# Patient Record
Sex: MALE | Race: WHITE
[De-identification: names, ages, dates, MRNs, and addresses within clinical notes are randomized per-mention and may not be internally consistent; named-entity substitution may affect disease eponyms.]

---

## 2018-01-26 ENCOUNTER — HOSPITAL ENCOUNTER (INPATIENT)
Dept: HOSPITAL 74 - JER | Age: 63
LOS: 1 days | Discharge: HOME | DRG: 253 | End: 2018-01-27
Attending: INTERNAL MEDICINE | Admitting: INTERNAL MEDICINE
Payer: COMMERCIAL

## 2018-01-26 VITALS — BODY MASS INDEX: 31 KG/M2

## 2018-01-26 DIAGNOSIS — E11.9: ICD-10-CM

## 2018-01-26 DIAGNOSIS — K92.2: Primary | ICD-10-CM

## 2018-01-26 DIAGNOSIS — I10: ICD-10-CM

## 2018-01-26 DIAGNOSIS — L30.9: ICD-10-CM

## 2018-01-26 LAB
ALBUMIN SERPL-MCNC: 4.1 G/DL (ref 3.4–5)
ALP SERPL-CCNC: 59 U/L (ref 45–117)
ALT SERPL-CCNC: 39 U/L (ref 12–78)
ANION GAP SERPL CALC-SCNC: 9 MMOL/L (ref 8–16)
APPEARANCE UR: CLEAR
AST SERPL-CCNC: 20 U/L (ref 15–37)
BACTERIA #/AREA URNS HPF: (no result) /HPF
BASOPHILS # BLD: 1 % (ref 0–2)
BILIRUB SERPL-MCNC: 0.4 MG/DL (ref 0.2–1)
BILIRUB UR STRIP.AUTO-MCNC: NEGATIVE MG/DL
BUN SERPL-MCNC: 17 MG/DL (ref 7–18)
CALCIUM SERPL-MCNC: 8.6 MG/DL (ref 8.5–10.1)
CHLORIDE SERPL-SCNC: 108 MMOL/L (ref 98–107)
CO2 SERPL-SCNC: 25 MMOL/L (ref 21–32)
COLOR UR: YELLOW
CREAT SERPL-MCNC: 0.9 MG/DL (ref 0.7–1.3)
DEPRECATED RDW RBC AUTO: 14 % (ref 11.9–15.9)
EOSINOPHIL # BLD: 3.1 % (ref 0–4.5)
EPITH CASTS URNS QL MICRO: (no result) /HPF
GLUCOSE SERPL-MCNC: 107 MG/DL (ref 74–106)
HCT VFR BLD CALC: 43.3 % (ref 35.4–49)
HGB BLD-MCNC: 14.6 GM/DL (ref 11.7–16.9)
HYALINE CASTS URNS QL MICRO: 2 /LPF
INR BLD: 0.93 (ref 0.82–1.09)
KETONES UR QL STRIP: NEGATIVE
LEUKOCYTE ESTERASE UR QL STRIP.AUTO: NEGATIVE
LYMPHOCYTES # BLD: 19 % (ref 8–40)
MCH RBC QN AUTO: 28.4 PG (ref 25.7–33.7)
MCHC RBC AUTO-ENTMCNC: 33.6 G/DL (ref 32–35.9)
MCV RBC: 84.4 FL (ref 80–96)
MONOCYTES # BLD AUTO: 6 % (ref 3.8–10.2)
MUCOUS THREADS URNS QL MICRO: (no result)
NEUTROPHILS # BLD: 70.9 % (ref 42.8–82.8)
NITRITE UR QL STRIP: NEGATIVE
PH UR: 5 [PH] (ref 5–8)
PLATELET # BLD AUTO: 261 K/MM3 (ref 134–434)
PMV BLD: 8.7 FL (ref 7.5–11.1)
POTASSIUM SERPLBLD-SCNC: 4.2 MMOL/L (ref 3.5–5.1)
PROT SERPL-MCNC: 7.5 G/DL (ref 6.4–8.2)
PROT UR QL STRIP: (no result)
PROT UR QL STRIP: NEGATIVE
PT PNL PPP: 10.5 SEC (ref 9.98–11.88)
RBC # BLD AUTO: 5.13 M/MM3 (ref 4–5.6)
RBC # UR STRIP: NEGATIVE /UL
SODIUM SERPL-SCNC: 142 MMOL/L (ref 136–145)
SP GR UR: 1.02 (ref 1–1.03)
UROBILINOGEN UR STRIP-MCNC: NEGATIVE MG/DL (ref 0.2–1)
WBC # BLD AUTO: 10.3 K/MM3 (ref 4–10)

## 2018-01-26 RX ADMIN — NICOTINE SCH MG: 21 PATCH TRANSDERMAL at 22:18

## 2018-01-26 RX ADMIN — INSULIN ASPART SCH: 100 INJECTION, SOLUTION INTRAVENOUS; SUBCUTANEOUS at 21:46

## 2018-01-26 NOTE — PDOC
History of Present Illness





- General


Chief Complaint: Rectal Bleed


Stated Complaint: BLOOD IN URINE/ STOOL


Time Seen by Provider: 01/26/18 16:48





- History of Present Illness


Initial Comments: 





Mr Gary is a 61yo M with a PMHx of NIDDM, Psoriasis, HTN who presents w/ 

painless hematochezia since this morning. This morning it was dark red, but 

while going to bathroom in ER, noticed the blood was bright red. Denies prior 

constipation or straining. Denies hx of hemorrhoids. Not on anticoagulants. 

However, has taken multiple NSAIDs over the past 4 days for tooth pain. Daily 1 

pack per day smoker. Last colonoscopy was 5 years ago with Dr York, told it 

was normal. No hx of UC/Crohns. Denies abdominal pain. Denies hematemesis, 

hemoptysis. 





Allergies - NKDA


SH - Daily 1ppd smoker. No alcohol


PMD - Dr. Rosa Funk ([facility])








Past History





- Past Medical History


Allergies/Adverse Reactions: 


 Allergies











Allergy/AdvReac Type Severity Reaction Status Date / Time


 


No Known Allergies Allergy   Verified 03/06/16 16:05











Home Medications: 


Ambulatory Orders





Amlodipine Besylate [Norvasc -] 10 mg PO DAILY 01/26/18 


Sitagliptin Phosphate [Januvia] 100 mg PO DAILY 01/26/18 








Anemia: No


Asthma: No


Cancer: No


Cardiac Disorders: No


CVA: No


COPD: No


CHF: No


Dementia: No


Diabetes: Yes (NIDDM)


GI Disorders: No


 Disorders: No


HTN: Yes


Hypercholesterolemia: Yes


Liver Disease: No


Seizures: No


Thyroid Disease: No





- Surgical History


Abdominal Surgery: Yes (HERNIA)


Appendectomy: Yes


Cardiac Surgery: No


Cholecystectomy: Yes


Lung Surgery: No


Neurologic Surgery: No


Orthopedic Surgery: No





- Suicide/Smoking/Psychosocial Hx


Smoking Status: Yes


Smoking History: Never smoked


Have you smoked in the past 12 months: Yes


Number of Cigarettes Smoked Daily: 5


If you are a former smoker, when did you quit?: 2 weeks ago


'Breaking Loose' booklet given: 07/12/15


Hx Alcohol Use: No


Drug/Substance Use Hx: No


Substance Use Type: None


Hx Substance Use Treatment: No





*Physical Exam





- Vital Signs


 Last Vital Signs











Temp Pulse Resp BP Pulse Ox


 


 97.6 F   79   16   138/84   99 


 


 01/26/18 14:04  01/26/18 14:04  01/26/18 14:04  01/26/18 14:04  01/26/18 14:04














- Physical Exam


Comments: 





GEN: AAOx3, NAD, Lying comfortably


HEENT: PERRLA, EOMi


CV: S1, S2, RRR


LUNG: CTABL


ABD: Soft, NT, ND, normoactive BS


MSK: No edema, numerous psoriatic lesions on body


: 


   No visible external hemorrhoids, no fissures


   On NERI, dark stool w/ streaks of blood


NEURO: CN 2-12 intact. No MSK or sensation deficits








ED Treatment Course





- LABORATORY


CBC & Chemistry Diagram: 


 01/26/18 17:20





 01/26/18 17:33





Medical Decision Making





- Medical Decision Making





61yo M who presented w/ painless melena after taking multiple NSAIDs, but now 

has BRBPR. Vitals are stable. No hemorrhoids/fissures on physical exam.


   DDx include: Diverticular bleed, Colitis, Colon CA. Because bleeding is non-

painful, unlikely diagnosis includes IBD, Ischemic colitis





-- CBC, CMP, Coags, T&S


-- Prior colonoscopy performed by Dr. York, will place call to Dr. York


-- Anticipate admission for possible scope. PCP is outside








01/26/18 18:41


Callback received from Dr. Delgado. Case discussed and accepted


Attempting to reach Dr. York second time.


Dr. Davis will takover the case from here.








*DC/Admit/Observation/Transfer


Diagnosis at time of Disposition: 


 Hematochezia








- Discharge Dispostion


Condition at time of disposition: Stable


Admit: Yes





- Referrals


Referrals: 


Rosa Daniels MD [Primary Care Provider] - 





- Patient Instructions





- Post Discharge Activity

## 2018-01-26 NOTE — PDOC
Attending Attestation





- HPI


HPI: 


01/26/18 17:38


The patient is a 62-year-old male, with a significant past medical history of 

NIDDM, Psoriasis, HTN who presents to the ED with rectal bleeding that began 

this morning. When the patient passed a bowel movement this morning and states 

that he noted his stool was dark red. While in the ED, the patient used the 

bathroom and states that the blood is now bright red.





<Bernadette Macedo - Last Filed: 01/26/18 17:38>





- Resident


Resident Name: Katherine Frank





- ED Attending Attestation


I have performed the following: I have examined & evaluated the patient, The 

case was reviewed & discussed with the resident, I agree w/resident's findings 

& plan, Exceptions are as noted





- Physicial Exam


PE: 





01/26/18 18:30


Patient is awake and alert, obese, hemodynamically stable, in no distress.





Normocephalic, atraumatic


PERRLA, EOMI,


CTA,


rrr


sft, nt, nd





- Medical Decision Making





01/26/18 18:31


Patient is a 62-year-old male with history of diabetes, psoriasis and heavy 

smoking who presents with several episodes of painless rectal bleeding with 

initially maroon blood followed by bright red blood per rectum. In the ER, 

patient is hemodynamically stable without evidence of active bleeding. Patient 

is noted to be guaiac positive. First CBC reveals H&H 14 and 43 respectively. I 

do not suspect ischemic colitis or inflammatory bowel disease at this time. 

Patient will require colonoscopy with GI. Will admit for serial CBCs, GI 

evaluation and treatment.





<Cornelius Davis - Last Filed: 01/26/18 18:32>

## 2018-01-26 NOTE — PN
Teaching Attending Note


Name of Resident: Thania Jamison





ATTENDING PHYSICIAN STATEMENT





I saw and evaluated the patient.


Chart, data, imaging reviewed. 


I reviewed the resident's note and discussed the case with the resident.


I agree with the resident's findings and plan as documented.








SUBJECTIVE:


61yo M with a PMHx of NIDDM, Psoriasis, HTN c/o 2 BMs on 1/26/17 with bright 

red blood. BMs were painless, formed. Amount of blood was small, not enough to 

fill small cup. There was no LOC or dizziness. No major changes in diet. GI was 

called, planned for colonoscopy. Last colonoscopy was 5 years ago and 

reportedly normal. Patient reported 2 days of NSAID use prior to presentation. 

Denied any epigastric pain or history of gastritis or PUD. NERI performed in ER 

and did not show external hemorrhoids. 





OBJECTIVE:


 Last Vital Signs











Temp Pulse Resp BP Pulse Ox


 


 97.6 F   75   17   136/82   98 


 


 01/26/18 14:04  01/26/18 19:18  01/26/18 19:18  01/26/18 19:18  01/26/18 19:18








general- comfortable, nad, obese 


heent- no sinus tenderness, moist oral mucosa 


neck -supple, no masses


cv -s1+s2+ rrr 


chest - cta b/l 


abdomen- soft, nt, no masses, obese 


ext -no pedal edema





CXR -reviewed, clear costophrenic angles 


 Abnormal Lab Results











  01/26/18 01/26/18 01/26/18





  17:20 17:20 17:33


 


WBC   10.3 H 


 


Chloride    108 H


 


Random Glucose    107 H D


 


Urine Protein  1+ H  


 


Crossmatch   














  01/26/18





  17:33


 


WBC 


 


Chloride 


 


Random Glucose 


 


Urine Protein 


 


Crossmatch  See Detail














ASSESSMENT AND PLAN:


#Painless bright red blood per rectum in a 61yo man. VS are stable and 

Hemoglobin and hematocrit normal stable. Differential diagnosis included 

internal hemorroids, AV malformation, diverticulosis. 


-admit to med/surg obs 


-monitor H,H 


-GI consult for colonoscopy 


-no heparin or NSAIDs 


-NPO past midnight 





-continue home meds for chronic medical problems 





-SCDs for DVT ppx

## 2018-01-26 NOTE — HP
CHIEF COMPLAINT: "I have blood in my stool" 





PCP:Dr. Rosa Funk ([facility])





HISTORY OF PRESENT ILLNESS:


This is a 63yo M with a PMH of NIDDM, Psoriasis, HTN who presents due to 2 

episodes of painless hematochezia since this morning. First BM had some dark 

blood and the second one had a smaller amount of bright red blood. both stools 

were well formed and patient reports being constipated a few days prior. He 

endorces having a similar episode once 7 yrs ago, whihc he attributed to 

hemorrhoids which were never officially diagnosed. he had a colonoscopy with Dr York 5-7 yrs ago which was unremarkable and was told to f/u in 5 yrs. Rectal 

exam in ed was negative for lesions but was occult blood positive. He denies 

abd pain, gerd, diarrhea, n/v, back pain, f/c, sick contacts. He denies weight 

loss. He denies cp, sob, cough, rhonorrhea, sore throat, myalgia, arthralgia. 

no family history of gi disorder. 





ER course was notable for:


(1)labs


(2)ekg, cxr:unremarkable 


(3)ivf





Recent Travel: denies 





PAST MEDICAL HISTORY:as above 





PAST SURGICAL HISTORY: appendectomy, cholecystectomy 40 yrs ago 





Social History: lives at home with wife 


[id number] yrs


Alcohol: denies


Drugs: denies





Family History: no history of GI cancer. Brother renal CA 


Allergies





No Known Allergies Allergy (Verified 16 16:05)


 








HOME MEDICATIONS:


 Home Medications











 Medication  Instructions  Recorded


 


Amlodipine Besylate [Norvasc -] 10 mg PO DAILY 18


 


Sitagliptin Phosphate [Januvia] 100 mg PO DAILY 18








REVIEW OF SYSTEMS


CONSTITUTIONAL: 


Absent:  fever, chills, diaphoresis, generalized weakness, malaise, loss of 

appetite, weight change


HEENT: 


Absent:  rhinorrhea, nasal congestion, throat pain


CARDIOVASCULAR: 


Absent: chest pain, syncope, palpitations, irregular heart rate, lightheadedness

, peripheral edema


RESPIRATORY: 


Absent: cough, shortness of breath, dyspnea with exertion, orthopnea, wheezing, 

stridor, hemoptysis


GASTROINTESTINAL:


Absent: abdominal pain, abdominal distension, nausea, vomiting, diarrhea, melena


GENITOURINARY: 


Absent: dysuria, hematuria


MUSCULOSKELETAL: 


Absent: myalgia, arthralgia


SKIN: 


Absent: pallor


HEMATOLOGIC/IMMUNOLOGIC: 


Absent: easy bleeding, easy bruising


ENDOCRINE:


Absent: unexplained weight gain, unexplained weight loss, heat intolerance, 

cold intolerance


NEUROLOGIC: 


Absent: headache, focal weakness or paresthesias


PSYCHIATRIC: 


Absent: anxiety, depression








PHYSICAL EXAMINATION


 Vital Signs - 24 hr











  18





  14:04 17:47 19:18


 


Temperature 97.6 F  


 


Pulse Rate 79  


 


Pulse Rate [  70 75





Radial]   


 


Respiratory 16 16 17





Rate   


 


Blood Pressure 138/84  


 


Blood Pressure  135/80 136/82





[Left Arm]   


 


O2 Sat by Pulse 99 96 98





Oximetry (%)   











GENERAL: Awake, alert, and fully oriented, in no acute distress.


HEAD: Normal with no signs of trauma.


EYES: Pupils equal, round and reactive to light, extraocular movements intact, 

sclera anicteric, conjunctiva clear. No lid lag.


EARS, NOSE, THROAT:  Moist mucous membranes.


NECK: supple


LUNGS: Breath sounds equal, clear to auscultation bilaterally.


HEART: Regular rate and rhythm, normal S1 and S2 


ABDOMEN: obese, RLW surgical scar, eczema, Soft, nontender, not distended, 

normoactive bowel sounds, no guarding, no rebound, no masses. 


RECTAL EXAM: no lesions noted per ED 


MUSCULOSKELETAL:  No CVA tenderness.


UPPER EXTREMITIES: 2+ pulses, warm, well-perfused.  No peripheral edema.


LOWER EXTREMITIES: 2+ pulses, warm, well-perfused. No calf tenderness. No 

peripheral edema. 


NEUROLOGICAL:  Cranial nerves II-XII grossly intact. Normal speech. Normal gait.


PSYCHIATRIC: Cooperative. Good eye contact. Appropriate mood and affect.


SKIN: diffuse eczema, Warm, dry, normal turgor


 Laboratory Results - last 24 hr











  18





  17:01 17:20 17:20


 


WBC    10.3 H


 


RBC    5.13


 


Hgb    14.6


 


Hct    43.3


 


MCV    84.4


 


MCH    28.4


 


MCHC    33.6


 


RDW    14.0


 


Plt Count    261


 


MPV    8.7


 


Neutrophils %    70.9


 


Lymphocytes %    19.0  D


 


Monocytes %    6.0


 


Eosinophils %    3.1  D


 


Basophils %    1.0


 


PT with INR   


 


INR   


 


Sodium   


 


Potassium   


 


Chloride   


 


Carbon Dioxide   


 


Anion Gap   


 


BUN   


 


Creatinine   


 


Creat Clearance w eGFR   


 


Random Glucose   


 


Calcium   


 


Total Bilirubin   


 


AST   


 


ALT   


 


Alkaline Phosphatase   


 


Total Protein   


 


Albumin   


 


Urine Color   Yellow 


 


Urine Appearance   Clear 


 


Urine pH   5.0 


 


Ur Specific Gravity   1.021 


 


Urine Protein   1+ H 


 


Urine Glucose (UA)   Negative 


 


Urine Ketones   Negative 


 


Urine Blood   Negative 


 


Urine Nitrite   Negative 


 


Urine Bilirubin   Negative 


 


Urine Urobilinogen   Negative 


 


Ur Leukocyte Esterase   Negative 


 


Urine WBC (Auto)   1 


 


Urine RBC (Auto)   1 


 


Ur Epithelial Cells   Rare 


 


Urine Bacteria   Rare 


 


Hyaline Casts   2 


 


Urine Mucus   Few 


 


Stool Occult Blood  Positive  


 


Blood Type   


 


Antibody Screen   


 


Crossmatch   














  18





  17:20 17:33 17:33


 


WBC   


 


RBC   


 


Hgb   


 


Hct   


 


MCV   


 


MCH   


 


MCHC   


 


RDW   


 


Plt Count   


 


MPV   


 


Neutrophils %   


 


Lymphocytes %   


 


Monocytes %   


 


Eosinophils %   


 


Basophils %   


 


PT with INR  10.50  


 


INR  0.93  


 


Sodium   142 


 


Potassium   4.2 


 


Chloride   108 H 


 


Carbon Dioxide   25 


 


Anion Gap   9 


 


BUN   17 


 


Creatinine   0.9 


 


Creat Clearance w eGFR   > 60 


 


Random Glucose   107 H D 


 


Calcium   8.6 


 


Total Bilirubin   0.4  D 


 


AST   20  D 


 


ALT   39 


 


Alkaline Phosphatase   59 


 


Total Protein   7.5 


 


Albumin   4.1 


 


Urine Color   


 


Urine Appearance   


 


Urine pH   


 


Ur Specific Gravity   


 


Urine Protein   


 


Urine Glucose (UA)   


 


Urine Ketones   


 


Urine Blood   


 


Urine Nitrite   


 


Urine Bilirubin   


 


Urine Urobilinogen   


 


Ur Leukocyte Esterase   


 


Urine WBC (Auto)   


 


Urine RBC (Auto)   


 


Ur Epithelial Cells   


 


Urine Bacteria   


 


Hyaline Casts   


 


Urine Mucus   


 


Stool Occult Blood   


 


Blood Type    AB POSITIVE


 


Antibody Screen    Negative


 


Crossmatch    See Detail











ASSESSMENT/PLAN:


This is a 63yo M with a PMH of NIDDM, Psoriasis, HTN who presents due to 2 

episodes of painless hematochezia since this morning.





Lower GIB


-upper gi source unlikely 


-possible sources include diverticulosis, av malformation, r/o CA 


-NPO, anticipate scope 


-IVF hydration 


-GI consulted 


-monitor h/h (stable at this time)


-patient hemodynamically stable 


-avoid a/c 





HTN


-resume home [facility]





NIDDM


-ISS, BGM, A1c 





Eczema


-topical hydrocortisone cream 





Dispo: adm m/s 


























Visit type





- Emergency Visit


Emergency Visit: Yes


ED Registration Date: 18


Care time: The patient presented to the Emergency Department on the above date 

and was hospitalized for further evaluation of their emergent condition.





- New Patient


This patient is new to me today: Yes


Date on this admission: 18





- Critical Care


Critical Care patient: No

## 2018-01-27 VITALS — DIASTOLIC BLOOD PRESSURE: 57 MMHG | SYSTOLIC BLOOD PRESSURE: 112 MMHG

## 2018-01-27 VITALS — TEMPERATURE: 98.1 F | HEART RATE: 70 BPM

## 2018-01-27 LAB
ANION GAP SERPL CALC-SCNC: 5 MMOL/L (ref 8–16)
BASOPHILS # BLD: 1 % (ref 0–2)
BUN SERPL-MCNC: 15 MG/DL (ref 7–18)
CALCIUM SERPL-MCNC: 8.6 MG/DL (ref 8.5–10.1)
CHLORIDE SERPL-SCNC: 107 MMOL/L (ref 98–107)
CO2 SERPL-SCNC: 30 MMOL/L (ref 21–32)
CREAT SERPL-MCNC: 0.9 MG/DL (ref 0.7–1.3)
DEPRECATED RDW RBC AUTO: 13.9 % (ref 11.9–15.9)
EOSINOPHIL # BLD: 4.3 % (ref 0–4.5)
GLUCOSE SERPL-MCNC: 117 MG/DL (ref 74–106)
HCT VFR BLD CALC: 41.3 % (ref 35.4–49)
HGB BLD-MCNC: 13.8 GM/DL (ref 11.7–16.9)
LYMPHOCYTES # BLD: 19.9 % (ref 8–40)
MAGNESIUM SERPL-MCNC: 2.1 MG/DL (ref 1.8–2.4)
MCH RBC QN AUTO: 28.2 PG (ref 25.7–33.7)
MCHC RBC AUTO-ENTMCNC: 33.4 G/DL (ref 32–35.9)
MCV RBC: 84.5 FL (ref 80–96)
MONOCYTES # BLD AUTO: 7.1 % (ref 3.8–10.2)
NEUTROPHILS # BLD: 67.7 % (ref 42.8–82.8)
PHOSPHATE SERPL-MCNC: 3.1 MG/DL (ref 2.5–4.9)
PLATELET # BLD AUTO: 235 K/MM3 (ref 134–434)
PMV BLD: 8.7 FL (ref 7.5–11.1)
POTASSIUM SERPLBLD-SCNC: 4.2 MMOL/L (ref 3.5–5.1)
RBC # BLD AUTO: 4.88 M/MM3 (ref 4–5.6)
SODIUM SERPL-SCNC: 142 MMOL/L (ref 136–145)
WBC # BLD AUTO: 8.5 K/MM3 (ref 4–10)

## 2018-01-27 RX ADMIN — INSULIN ASPART SCH: 100 INJECTION, SOLUTION INTRAVENOUS; SUBCUTANEOUS at 06:57

## 2018-01-27 RX ADMIN — NICOTINE SCH MG: 21 PATCH TRANSDERMAL at 10:59

## 2018-01-27 RX ADMIN — INSULIN ASPART SCH: 100 INJECTION, SOLUTION INTRAVENOUS; SUBCUTANEOUS at 11:58

## 2018-01-27 NOTE — CON.GI
Consult


Consult Specialty:: gastroenterology


Referred by:: hospitalist





- Past Medical History


Cardio/Vascular: Yes: HTN


Gastrointestinal: Yes: Other (umbilical hernia)


Endocrine: Yes: Diabetes Mellitus


Dermatology: Yes: Psoriasis





- Alcohol/Substance Use


Hx Alcohol Use: No





- Smoking History


Smoking history: Never smoked


Have you smoked in the past 12 months: Yes


Aproximately how many cigarettes per day: 5


If you are a former smoker, when did you quit?: 2 weeks ago





- Social History


ADL: Independent





Home Medications





- Allergies


Allergies/Adverse Reactions: 


 Allergies











Allergy/AdvReac Type Severity Reaction Status Date / Time


 


No Known Allergies Allergy   Verified 03/06/16 16:05














- Home Medications


Home Medications: 


Ambulatory Orders





Amlodipine Besylate [Norvasc -] 10 mg PO DAILY 01/26/18 


Sitagliptin Phosphate [Januvia] 100 mg PO DAILY 01/26/18 











Family Disease History





- Family Disease History


Family Disease History: CA: Brother (Unknown type)





Physical Exam-GI


Vital Signs: 


 Vital Signs











Temperature  98.1 F   01/27/18 10:00


 


Pulse Rate  70   01/27/18 10:00


 


Respiratory Rate  20   01/27/18 10:00


 


Blood Pressure  114/91   01/27/18 10:00


 


O2 Sat by Pulse Oximetry (%)  98   01/27/18 09:00











Labs: 


 CBC, BMP





 01/27/18 05:50 





 01/27/18 05:50 





 INR, PTT











INR  0.93  (0.82-1.09)   01/26/18  17:20

## 2018-01-27 NOTE — EKG
Test Reason : 

Blood Pressure : ***/*** mmHG

Vent. Rate : 071 BPM     Atrial Rate : 071 BPM

   P-R Int : 168 ms          QRS Dur : 094 ms

    QT Int : 422 ms       P-R-T Axes : 051 039 043 degrees

   QTc Int : 458 ms

 

NORMAL SINUS RHYTHM

POSSIBLE LEFT ATRIAL ENLARGEMENT

BORDERLINE ECG

WHEN COMPARED WITH ECG OF 12-JUL-2015 13:49,

NO SIGNIFICANT CHANGE WAS FOUND

Confirmed by MD ZARI, WILLEM ([id number]) on 1/27/2018 12:18:49 PM

 

Referred By:             Confirmed By:WILLEM HAMEED MD

## 2018-01-27 NOTE — DS
Physical Exam: 


SUBJECTIVE: Patient seen and examined. has had 2 soft, brown BM today. admits 

to straining for the past few days. had 3 episodes of BRBPR where it was not 

mixed in the stool. had colonoscopy 5 years ago, [facility] he was told he had 

hemrrhoids but states "inside I was clean". denies CP, SOB, fever, chills, N/V/C

/D








OBJECTIVE:





 Vital Signs











 Period  Temp  Pulse  Resp  BP Sys/Leon  Pulse Ox


 


 Last 24 Hr  98.0 F-98.6 F  68-87  16-20  110-136/57-91  96-98








PHYSICAL EXAM





GENERAL: The patient is awake, alert, and fully oriented, in no acute distress.


HEAD: Normal with no signs of trauma.


EYES: PERRL, extraocular movements intact, sclera anicteric, conjunctiva clear. 


ENT: Ears normal, nares patent, oropharynx clear without exudates, moist mucous 

membranes.


NECK: Trachea midline, full range of motion, supple. 


LUNGS: Breath sounds equal, clear to auscultation bilaterally, no wheezes, no 

crackles, no accessory muscle use. 


HEART: Regular rate and rhythm, S1, S2 without murmur, rub or gallop.


ABDOMEN: Soft, nontender, nondistended, normoactive bowel sounds, no guarding, 

no rebound, no hepatosplenomegaly, no masses. obese. refused rectal exam


EXTREMITIES: 2+ pulses, warm, well-perfused, no edema. 


NEUROLOGICAL: Cranial nerves II through XII grossly intact. Normal speech, gait 

not observed.


PSYCH: Normal mood, normal affect.


SKIN: Warm, dry, normal turgor, no rashes or lesions noted.





LABS


 Laboratory Results - last 24 hr











  01/26/18 01/26/18 01/26/18





  17:01 17:20 17:20


 


WBC    10.3 H


 


RBC    5.13


 


Hgb    14.6


 


Hct    43.3


 


MCV    84.4


 


MCH    28.4


 


MCHC    33.6


 


RDW    14.0


 


Plt Count    261


 


MPV    8.7


 


Neutrophils %    70.9


 


Lymphocytes %    19.0  D


 


Monocytes %    6.0


 


Eosinophils %    3.1  D


 


Basophils %    1.0


 


PT with INR   


 


INR   


 


Sodium   


 


Potassium   


 


Chloride   


 


Carbon Dioxide   


 


Anion Gap   


 


BUN   


 


Creatinine   


 


Creat Clearance w eGFR   


 


POC Glucometer   


 


Random Glucose   


 


Hemoglobin A1c %   


 


Calcium   


 


Phosphorus   


 


Magnesium   


 


Total Bilirubin   


 


AST   


 


ALT   


 


Alkaline Phosphatase   


 


Total Protein   


 


Albumin   


 


Urine Color   Yellow 


 


Urine Appearance   Clear 


 


Urine pH   5.0 


 


Ur Specific Gravity   1.021 


 


Urine Protein   1+ H 


 


Urine Glucose (UA)   Negative 


 


Urine Ketones   Negative 


 


Urine Blood   Negative 


 


Urine Nitrite   Negative 


 


Urine Bilirubin   Negative 


 


Urine Urobilinogen   Negative 


 


Ur Leukocyte Esterase   Negative 


 


Urine WBC (Auto)   1 


 


Urine RBC (Auto)   1 


 


Ur Epithelial Cells   Rare 


 


Urine Bacteria   Rare 


 


Hyaline Casts   2 


 


Urine Mucus   Few 


 


Stool Occult Blood  Positive  


 


Blood Type   


 


Antibody Screen   


 


Crossmatch   














  01/26/18 01/26/18 01/26/18





  17:20 17:33 17:33


 


WBC   


 


RBC   


 


Hgb   


 


Hct   


 


MCV   


 


MCH   


 


MCHC   


 


RDW   


 


Plt Count   


 


MPV   


 


Neutrophils %   


 


Lymphocytes %   


 


Monocytes %   


 


Eosinophils %   


 


Basophils %   


 


PT with INR  10.50  


 


INR  0.93  


 


Sodium   142 


 


Potassium   4.2 


 


Chloride   108 H 


 


Carbon Dioxide   25 


 


Anion Gap   9 


 


BUN   17 


 


Creatinine   0.9 


 


Creat Clearance w eGFR   > 60 


 


POC Glucometer   


 


Random Glucose   107 H D 


 


Hemoglobin A1c %   


 


Calcium   8.6 


 


Phosphorus   


 


Magnesium   


 


Total Bilirubin   0.4  D 


 


AST   20  D 


 


ALT   39 


 


Alkaline Phosphatase   59 


 


Total Protein   7.5 


 


Albumin   4.1 


 


Urine Color   


 


Urine Appearance   


 


Urine pH   


 


Ur Specific Gravity   


 


Urine Protein   


 


Urine Glucose (UA)   


 


Urine Ketones   


 


Urine Blood   


 


Urine Nitrite   


 


Urine Bilirubin   


 


Urine Urobilinogen   


 


Ur Leukocyte Esterase   


 


Urine WBC (Auto)   


 


Urine RBC (Auto)   


 


Ur Epithelial Cells   


 


Urine Bacteria   


 


Hyaline Casts   


 


Urine Mucus   


 


Stool Occult Blood   


 


Blood Type    AB POSITIVE


 


Antibody Screen    Negative


 


Crossmatch    See Detail














  01/26/18 01/27/18 01/27/18





  21:45 05:50 05:50


 


WBC   8.5 


 


RBC   4.88 


 


Hgb   13.8 


 


Hct   41.3 


 


MCV   84.5 


 


MCH   28.2 


 


MCHC   33.4 


 


RDW   13.9 


 


Plt Count   235 


 


MPV   8.7 


 


Neutrophils %   67.7 


 


Lymphocytes %   19.9 


 


Monocytes %   7.1 


 


Eosinophils %   4.3 


 


Basophils %   1.0 


 


PT with INR   


 


INR   


 


Sodium    142


 


Potassium    4.2


 


Chloride    107


 


Carbon Dioxide    30


 


Anion Gap    5 L


 


BUN    15


 


Creatinine    0.9


 


Creat Clearance w eGFR   


 


POC Glucometer  110.82710  


 


Random Glucose    117 H


 


Hemoglobin A1c %   


 


Calcium    8.6


 


Phosphorus    3.1


 


Magnesium    2.1


 


Total Bilirubin   


 


AST   


 


ALT   


 


Alkaline Phosphatase   


 


Total Protein   


 


Albumin   


 


Urine Color   


 


Urine Appearance   


 


Urine pH   


 


Ur Specific Gravity   


 


Urine Protein   


 


Urine Glucose (UA)   


 


Urine Ketones   


 


Urine Blood   


 


Urine Nitrite   


 


Urine Bilirubin   


 


Urine Urobilinogen   


 


Ur Leukocyte Esterase   


 


Urine WBC (Auto)   


 


Urine RBC (Auto)   


 


Ur Epithelial Cells   


 


Urine Bacteria   


 


Hyaline Casts   


 


Urine Mucus   


 


Stool Occult Blood   


 


Blood Type   


 


Antibody Screen   


 


Crossmatch   














  01/27/18 01/27/18 01/27/18





  05:50 06:55 11:52


 


WBC   


 


RBC   


 


Hgb   


 


Hct   


 


MCV   


 


MCH   


 


MCHC   


 


RDW   


 


Plt Count   


 


MPV   


 


Neutrophils %   


 


Lymphocytes %   


 


Monocytes %   


 


Eosinophils %   


 


Basophils %   


 


PT with INR   


 


INR   


 


Sodium   


 


Potassium   


 


Chloride   


 


Carbon Dioxide   


 


Anion Gap   


 


BUN   


 


Creatinine   


 


Creat Clearance w eGFR   


 


POC Glucometer   119  126


 


Random Glucose   


 


Hemoglobin A1c %  7.6 H  


 


Calcium   


 


Phosphorus   


 


Magnesium   


 


Total Bilirubin   


 


AST   


 


ALT   


 


Alkaline Phosphatase   


 


Total Protein   


 


Albumin   


 


Urine Color   


 


Urine Appearance   


 


Urine pH   


 


Ur Specific Gravity   


 


Urine Protein   


 


Urine Glucose (UA)   


 


Urine Ketones   


 


Urine Blood   


 


Urine Nitrite   


 


Urine Bilirubin   


 


Urine Urobilinogen   


 


Ur Leukocyte Esterase   


 


Urine WBC (Auto)   


 


Urine RBC (Auto)   


 


Ur Epithelial Cells   


 


Urine Bacteria   


 


Hyaline Casts   


 


Urine Mucus   


 


Stool Occult Blood   


 


Blood Type   


 


Antibody Screen   


 


Crossmatch   











HOSPITAL COURSE:





Date of Admission:01/26/18





Date of Discharge: 01/27/18








Admitting diagnosis: BRBPR





Pre hospital course


63yo M with a PMH of NIDDM, Psoriasis, HTN who presents due to 2 episodes of 

painless hematochezia since this morning. First BM had some dark blood and the 

second one had a smaller amount of bright red blood. both stools were well 

formed and patient reports being constipated a few days prior. He endorces 

having a similar episode once 7 yrs ago, whc he attributed to hemorrhoids 

which were never officially diagnosed. he had a colonoscopy with Dr York 5-7 

yrs ago which was unremarkable and was told to f/u in 5 yrs. Rectal exam in ed 

was negative for lesions but was occult blood positive. He denies abd pain, gerd

, diarrhea, n/v, back pain, f/c, sick contacts. He denies weight loss. He 

denies cp, sob, cough, rhonorrhea, sore throat, myalgia, arthralgia. no family 

history of gi disorder. 





Subsequent hospital course


Admitted to medicine. was made NPO and started on IVF. pt had 2 normal BM today 

without blood. no abdominal pain. diet was advanced and tolerated well. hgb 

remained stable. spoke with Dr York who states pt can follow up in his office 

next week to schedule colonoscopy. pt verbalized understanding with plan. d/c 

home


Minutes to complete discharge: 40





Discharge Summary


Reason For Visit: HEMATOCHEZIA


Current Active Problems





Hematochezia (Acute)








Condition: Stable





- Instructions


Referrals: 


Rosa Daniels MD [Primary Care Provider] - 





- Home Medications


Comprehensive Discharge Medication List: 


Ambulatory Orders





Amlodipine Besylate [Norvasc -] 10 mg PO DAILY 01/26/18 


Sitagliptin Phosphate [Januvia] 100 mg PO DAILY 01/26/18 








This patient is new to me today: Yes


Date on this admission: 01/27/18


Emergency Visit: Yes


ED Registration Date: 01/26/18


Care time: The patient presented to the Emergency Department on the above date 

and was hospitalized for further evaluation of their emergent condition.


Critical Care patient: No





- Discharge Referral


Referred to [facility] No

## 2018-11-01 ENCOUNTER — HOSPITAL ENCOUNTER (EMERGENCY)
Dept: HOSPITAL 74 - JERFT | Age: 63
Discharge: HOME | End: 2018-11-01
Payer: COMMERCIAL

## 2018-11-01 VITALS — SYSTOLIC BLOOD PRESSURE: 179 MMHG | DIASTOLIC BLOOD PRESSURE: 90 MMHG | HEART RATE: 106 BPM | TEMPERATURE: 98 F

## 2018-11-01 VITALS — BODY MASS INDEX: 29.4 KG/M2

## 2018-11-01 DIAGNOSIS — I10: ICD-10-CM

## 2018-11-01 DIAGNOSIS — E78.00: ICD-10-CM

## 2018-11-01 DIAGNOSIS — M25.551: Primary | ICD-10-CM

## 2018-11-01 DIAGNOSIS — Z79.84: ICD-10-CM

## 2018-11-01 DIAGNOSIS — E11.9: ICD-10-CM

## 2018-11-01 LAB
APPEARANCE UR: CLEAR
BILIRUB UR STRIP.AUTO-MCNC: NEGATIVE MG/DL
COLOR UR: YELLOW
EPITH CASTS URNS QL MICRO: (no result) /HPF
KETONES UR QL STRIP: NEGATIVE
LEUKOCYTE ESTERASE UR QL STRIP.AUTO: NEGATIVE
MUCOUS THREADS URNS QL MICRO: (no result)
NITRITE UR QL STRIP: NEGATIVE
PH UR: 5 [PH] (ref 5–8)
PROT UR QL STRIP: (no result)
PROT UR QL STRIP: (no result)
SP GR UR: 1.02 (ref 1.01–1.03)
UROBILINOGEN UR STRIP-MCNC: NEGATIVE MG/DL (ref 0.2–1)

## 2018-11-01 PROCEDURE — 3E0233Z INTRODUCTION OF ANTI-INFLAMMATORY INTO MUSCLE, PERCUTANEOUS APPROACH: ICD-10-PCS

## 2018-11-01 NOTE — PDOC
Rapid Medical Evaluation


Chief Complaint: Pain


Time Seen by Provider: 11/01/18 18:57


Medical Evaluation: 


 Allergies











Allergy/AdvReac Type Severity Reaction Status Date / Time


 


No Known Allergies Allergy   Verified 03/06/16 16:05











11/01/18 18:58





I have performed a brief in person evaluation of this patient.





The patient presents with a CC of: right hip pain





Pt is a 63 YO male who complains of right hip pain x 1 day. Pt states that he 

has had an xray and MRI of the right hip via his PCP and they were negative.  





PE:


Ambulated to triage


Skin: clear


Lungs: Clear


Heart: RRR


MS: Moves all extremities without difficulty.


Psych: Age appropriate.





Right hip xray ordered. 








The patient will proceed to the ED for further evaluation.





**Discharge Disposition





- Diagnosis


Hip pain


Qualifiers:


 Laterality: right Qualified Code(s): M25.551 - Pain in right hip








- Referrals





- Patient Instructions





- Post Discharge Activity

## 2018-11-01 NOTE — PDOC
History of Present Illness





- General


Chief Complaint: Pain


Stated Complaint: RT HIP PAIN


Time Seen by Provider: 11/01/18 18:57


History Source: Patient





Past History





- Past Medical History


Allergies/Adverse Reactions: 


 Allergies











Allergy/AdvReac Type Severity Reaction Status Date / Time


 


No Known Allergies Allergy   Verified 11/01/18 18:58











Home Medications: 


Ambulatory Orders





NK [No Known Home Medication]  11/01/18 








Anemia: No


Asthma: No


Cancer: No


Cardiac Disorders: No


CVA: No


COPD: No


CHF: No


DVT: No


Dementia: No


Diabetes: Yes (NIDDM)


GI Disorders: No


 Disorders: No


HTN: Yes


Hypercholesterolemia: Yes


Liver Disease: No


Seizures: No


Thyroid Disease: No





- Surgical History


Abdominal Surgery: Yes (HERNIA)


Appendectomy: Yes


Cardiac Surgery: No


Cholecystectomy: Yes


Lung Surgery: No


Neurologic Surgery: No


Orthopedic Surgery: No





- Suicide/Smoking/Psychosocial Hx


Smoking Status: Yes


Smoking History: Never smoked


Have you smoked in the past 12 months: Yes


Number of Cigarettes Smoked Daily: 10


If you are a former smoker, when did you quit?: 2 weeks ago


Information on smoking cessation initiated: No


'Breaking Loose' booklet given: 07/12/15


Hx Alcohol Use: No


Drug/Substance Use Hx: No


Substance Use Type: None


Hx Substance Use Treatment: No





*Physical Exam





- Vital Signs


 Last Vital Signs











Temp Pulse Resp BP Pulse Ox


 


 98.0 F   106 H  18   179/90 H  100 


 


 11/01/18 18:59  11/01/18 18:59  11/01/18 18:59  11/01/18 18:59  11/01/18 18:59














ED Treatment Course





- ADDITIONAL ORDERS


Additional order review: 


 Laboratory  Results











  11/01/18





  15:23


 


Urine Color  Yellow


 


Urine Appearance  Clear


 


Urine pH  5.0


 


Ur Specific Gravity  1.020


 


Urine Protein  2+ H


 


Urine Glucose (UA)  1+ H


 


Urine Ketones  Negative


 


Urine Blood  1+ H


 


Urine Nitrite  Negative


 


Urine Bilirubin  Negative


 


Urine Urobilinogen  Negative


 


Ur Leukocyte Esterase  Negative


 


Urine WBC (Auto)  1


 


Urine RBC (Auto)  1


 


Ur Epithelial Cells  Rare


 


Urine Mucus  Rare














*DC/Admit/Observation/Transfer


Diagnosis at time of Disposition: 


Hip pain


Qualifiers:


 Laterality: right Qualified Code(s): M25.551 - Pain in right hip








- Discharge Dispostion


Disposition: HOME


Condition at time of disposition: Stable


Decision to Admit order: No





- Referrals


Referrals: 


Rosa Daniels MD [Primary Care Provider] - 





- Patient Instructions


Printed Discharge Instructions:  DI for Hip Pain


Additional Instructions: 


Your x-ray is negative for fractures


You most likely have some form arthritis


Please take Motrin 800mg every 8 hours not to exceed 3,000mg a day


You may take the flexeril at night before bed. Do not drive after taking this 

medication as is may make you sleepy


Follow up with your primary care doctor this week





Return to the ED for any new or worsening symptoms





- Post Discharge Activity

## 2019-06-21 ENCOUNTER — HOSPITAL ENCOUNTER (EMERGENCY)
Dept: HOSPITAL 74 - JERFT | Age: 64
Discharge: HOME | End: 2019-06-21
Payer: COMMERCIAL

## 2019-06-21 VITALS — HEART RATE: 81 BPM | TEMPERATURE: 98 F | DIASTOLIC BLOOD PRESSURE: 74 MMHG | SYSTOLIC BLOOD PRESSURE: 150 MMHG

## 2019-06-21 VITALS — BODY MASS INDEX: 31 KG/M2

## 2019-06-21 DIAGNOSIS — I10: ICD-10-CM

## 2019-06-21 DIAGNOSIS — N40.0: ICD-10-CM

## 2019-06-21 DIAGNOSIS — Z79.84: ICD-10-CM

## 2019-06-21 DIAGNOSIS — R30.0: Primary | ICD-10-CM

## 2019-06-21 DIAGNOSIS — E11.9: ICD-10-CM

## 2019-06-21 DIAGNOSIS — F17.210: ICD-10-CM

## 2019-06-21 DIAGNOSIS — E78.00: ICD-10-CM

## 2019-06-21 LAB
ALBUMIN SERPL-MCNC: 3.8 G/DL (ref 3.4–5)
ALP SERPL-CCNC: 61 U/L (ref 45–117)
ALT SERPL-CCNC: 54 U/L (ref 13–61)
ANION GAP SERPL CALC-SCNC: 7 MMOL/L (ref 8–16)
APPEARANCE UR: CLEAR
AST SERPL-CCNC: 21 U/L (ref 15–37)
BACTERIA # UR AUTO: 1.9 /HPF
BASOPHILS # BLD: 0.6 % (ref 0–2)
BILIRUB SERPL-MCNC: 0.2 MG/DL (ref 0.2–1)
BILIRUB UR STRIP.AUTO-MCNC: NEGATIVE MG/DL
BUN SERPL-MCNC: 23.2 MG/DL (ref 7–18)
CALCIUM SERPL-MCNC: 9.2 MG/DL (ref 8.5–10.1)
CASTS URNS QL MICRO: 14 /LPF (ref 0–8)
CHLORIDE SERPL-SCNC: 107 MMOL/L (ref 98–107)
CO2 SERPL-SCNC: 25 MMOL/L (ref 21–32)
COLOR UR: YELLOW
CREAT SERPL-MCNC: 1 MG/DL (ref 0.55–1.3)
DEPRECATED RDW RBC AUTO: 14.2 % (ref 11.9–15.9)
EOSINOPHIL # BLD: 1.6 % (ref 0–4.5)
EPITH CASTS URNS QL MICRO: 1.3 /HPF
GLUCOSE SERPL-MCNC: 232 MG/DL (ref 74–106)
HCT VFR BLD CALC: 42.6 % (ref 35.4–49)
HGB BLD-MCNC: 14.3 GM/DL (ref 11.7–16.9)
KETONES UR QL STRIP: NEGATIVE
LEUKOCYTE ESTERASE UR QL STRIP.AUTO: NEGATIVE
LYMPHOCYTES # BLD: 12.5 % (ref 8–40)
MCH RBC QN AUTO: 28.7 PG (ref 25.7–33.7)
MCHC RBC AUTO-ENTMCNC: 33.6 G/DL (ref 32–35.9)
MCV RBC: 85.4 FL (ref 80–96)
MONOCYTES # BLD AUTO: 5.2 % (ref 3.8–10.2)
NEUTROPHILS # BLD: 80.1 % (ref 42.8–82.8)
NITRITE UR QL STRIP: NEGATIVE
PH UR: 5 [PH] (ref 5–8)
PLATELET # BLD AUTO: 256 K/MM3 (ref 134–434)
PMV BLD: 8.8 FL (ref 7.5–11.1)
POTASSIUM SERPLBLD-SCNC: 4.2 MMOL/L (ref 3.5–5.1)
PROT SERPL-MCNC: 7.4 G/DL (ref 6.4–8.2)
PROT UR QL STRIP: (no result)
PROT UR QL STRIP: NEGATIVE
RBC # BLD AUTO: 1 /HPF (ref 0–4)
RBC # BLD AUTO: 4.99 M/MM3 (ref 4–5.6)
SODIUM SERPL-SCNC: 139 MMOL/L (ref 136–145)
SP GR UR: 1.02 (ref 1.01–1.03)
UROBILINOGEN UR STRIP-MCNC: 0.2 MG/DL (ref 0.2–1)
WBC # BLD AUTO: 14.8 K/MM3 (ref 4–10)
WBC # UR AUTO: 2 /HPF (ref 0–5)

## 2019-06-21 NOTE — PDOC
Rapid Medical Evaluation


Time Seen by Provider: 06/21/19 16:57


Medical Evaluation: 


 Allergies











Allergy/AdvReac Type Severity Reaction Status Date / Time


 


No Known Allergies Allergy   Verified 06/21/19 16:57











06/21/19 16:58





I have performed a brief in-person evaluation of this patient.





The patient presents with a chief complaint of:burning w/ urination x 3 days 

and "does not feel right" per pt. No abd pain/flank pain, n/v/f/c. H/o HTN, 

NIDDM, renal stones BPH, psoriases





Pertinent physical exam findings:Stable and well thong





I have ordered the following:labs/ua





The patient will proceed to the ED for further evaluation.








**Discharge Disposition





- Diagnosis


 Dysuria








- Referrals





- Patient Instructions





- Post Discharge Activity

## 2019-07-07 ENCOUNTER — HOSPITAL ENCOUNTER (OUTPATIENT)
Dept: HOSPITAL 74 - JER | Age: 64
Setting detail: OBSERVATION
LOS: 1 days | Discharge: ANOTHER HOSPITAL | End: 2019-07-08
Attending: INTERNAL MEDICINE | Admitting: INTERNAL MEDICINE
Payer: COMMERCIAL

## 2019-07-07 VITALS — BODY MASS INDEX: 36.1 KG/M2

## 2019-07-07 DIAGNOSIS — I21.29: Primary | ICD-10-CM

## 2019-07-07 DIAGNOSIS — I10: ICD-10-CM

## 2019-07-07 DIAGNOSIS — Z79.84: ICD-10-CM

## 2019-07-07 DIAGNOSIS — F17.210: ICD-10-CM

## 2019-07-07 DIAGNOSIS — E11.9: ICD-10-CM

## 2019-07-07 DIAGNOSIS — R07.9: ICD-10-CM

## 2019-07-07 DIAGNOSIS — L40.9: ICD-10-CM

## 2019-07-07 LAB
ALBUMIN SERPL-MCNC: 3.8 G/DL (ref 3.4–5)
ALP SERPL-CCNC: 59 U/L (ref 45–117)
ALT SERPL-CCNC: 34 U/L (ref 13–61)
ANION GAP SERPL CALC-SCNC: 4 MMOL/L (ref 8–16)
APTT BLD: 32 SECONDS (ref 25.2–36.5)
AST SERPL-CCNC: 10 U/L (ref 15–37)
BASOPHILS # BLD: 0.7 % (ref 0–2)
BILIRUB SERPL-MCNC: 0.3 MG/DL (ref 0.2–1)
BNP SERPL-MCNC: 91.5 PG/ML (ref 5–125)
BUN SERPL-MCNC: 15.5 MG/DL (ref 7–18)
CALCIUM SERPL-MCNC: 9.1 MG/DL (ref 8.5–10.1)
CHLORIDE SERPL-SCNC: 103 MMOL/L (ref 98–107)
CO2 SERPL-SCNC: 29 MMOL/L (ref 21–32)
CREAT SERPL-MCNC: 1 MG/DL (ref 0.55–1.3)
DEPRECATED RDW RBC AUTO: 14.2 % (ref 11.9–15.9)
EOSINOPHIL # BLD: 3.6 % (ref 0–4.5)
GLUCOSE SERPL-MCNC: 402 MG/DL (ref 74–106)
HCT VFR BLD CALC: 42.4 % (ref 35.4–49)
HGB BLD-MCNC: 14.2 GM/DL (ref 11.7–16.9)
INR BLD: 0.88 (ref 0.83–1.09)
LYMPHOCYTES # BLD: 18.5 % (ref 8–40)
MAGNESIUM SERPL-MCNC: 2.4 MG/DL (ref 1.8–2.4)
MCH RBC QN AUTO: 28.5 PG (ref 25.7–33.7)
MCHC RBC AUTO-ENTMCNC: 33.4 G/DL (ref 32–35.9)
MCV RBC: 85.1 FL (ref 80–96)
MONOCYTES # BLD AUTO: 6.1 % (ref 3.8–10.2)
NEUTROPHILS # BLD: 71.1 % (ref 42.8–82.8)
PLATELET # BLD AUTO: 223 K/MM3 (ref 134–434)
PMV BLD: 8.7 FL (ref 7.5–11.1)
POTASSIUM SERPLBLD-SCNC: 4.1 MMOL/L (ref 3.5–5.1)
PROT SERPL-MCNC: 7.3 G/DL (ref 6.4–8.2)
PT PNL PPP: 10.4 SEC (ref 9.7–13)
RBC # BLD AUTO: 4.99 M/MM3 (ref 4–5.6)
SODIUM SERPL-SCNC: 137 MMOL/L (ref 136–145)
WBC # BLD AUTO: 9 K/MM3 (ref 4–10)

## 2019-07-07 PROCEDURE — G0378 HOSPITAL OBSERVATION PER HR: HCPCS

## 2019-07-07 PROCEDURE — 3E033GC INTRODUCTION OF OTHER THERAPEUTIC SUBSTANCE INTO PERIPHERAL VEIN, PERCUTANEOUS APPROACH: ICD-10-PCS | Performed by: INTERNAL MEDICINE

## 2019-07-07 RX ADMIN — INSULIN ASPART SCH UNITS: 100 INJECTION, SOLUTION INTRAVENOUS; SUBCUTANEOUS at 21:36

## 2019-07-07 RX ADMIN — INSULIN ASPART SCH UNITS: 100 INJECTION, SOLUTION INTRAVENOUS; SUBCUTANEOUS at 17:10

## 2019-07-07 NOTE — PDOC
Documentation entered by Gabrielle Agee SCRIBE, acting as scribe for 

Shellie Ardon DO.








Shellie Ardon DO:  This documentation has been prepared by the Anuel phillips Mackenzie, SCRIBE, under my direction and personally reviewed by me 

in its entirety.  I confirm that the documentation accurately reflects all work

, treatment, procedures, and medical decision making performed by me.  





History of Present Illness





- General


Chief Complaint: Chest Pain


Stated Complaint: CHEST PAIN


Time Seen by Provider: 07/07/19 09:53


History Source: Patient


Exam Limitations: No Limitations





- History of Present Illness


Initial Comments: 


The patient is a 63 year old male, with a significant PMH of HTN, DM, tobacco 

use, and psoriasis, who presents to the emergency department with chest pain 

starting at approximately 7:30AM today. Patient states the pain came on while 

lying down, initially a 9/10 in severity but now a 5/10. Patient reports the 

chest pain is an intermittent  diffuse burning pressure all over his chest 

which radiates to his left arm.  Patient notes changes in sensation in left 

upper extremity and states he feels weak and tired.  Patient also notes 

eating a very salty salami last night. 





The patient denies palpitations, shortness of breath, headache and dizziness.


Denies fever, chills, nausea, vomiting, diarrhea and constipation.


Denies dysuria, frequency, urgency and hematuria.





Allergies: NKDA


Past surgical history: Appendectomy, Cholecystectomy 


Social history: Smokes half a pack per day for the past 50 years 


PCP: Dr. Daniels


 








07/07/19 10:50











Past History





- Past Medical History


Allergies/Adverse Reactions: 


 Allergies











Allergy/AdvReac Type Severity Reaction Status Date / Time


 


No Known Allergies Allergy   Verified 07/07/19 09:44











Home Medications: 


Ambulatory Orders





Apremilast [Otezla] 30 mg PO ASDIR 06/21/19 


Tamsulosin HCl [Flomax] 0.4 mg PO DAILY 06/21/19 








Anemia: No


Asthma: No


Cancer: No


Cardiac Disorders: No


CVA: No


COPD: No


CHF: No


DVT: No


Dementia: No


Diabetes: Yes (NIDDM)


GI Disorders: No


 Disorders: No


HTN: Yes


Hypercholesterolemia: Yes


Liver Disease: No


Seizures: No


Thyroid Disease: No





- Surgical History


Abdominal Surgery: Yes (HERNIA)


Appendectomy: Yes


Cardiac Surgery: No


Cholecystectomy: Yes


Lung Surgery: No


Neurologic Surgery: No


Orthopedic Surgery: No





- Suicide/Smoking/Psychosocial Hx


Smoking Status: Yes


Smoking History: Current every day smoker


Have you smoked in the past 12 months: Yes


Number of Cigarettes Smoked Daily: 10


If you are a former smoker, when did you quit?: 2 weeks ago


Information on smoking cessation initiated: No


'Breaking Loose' booklet given: 07/12/15


Hx Alcohol Use: No


Drug/Substance Use Hx: No


Substance Use Type: None


Hx Substance Use Treatment: No





**Review of Systems





- Review of Systems


Comments:: 


GENERAL/CONSTITUTIONAL: (+)Weakness. No fever or chills. 


HEAD, EYES, EARS, NOSE AND THROAT: No change in vision. No ear pain or 

discharge. No sore throat.


GASTROINTESTINAL: No nausea, vomiting, diarrhea or constipation.


GENITOURINARY: No dysuria, frequency, or change in urination.


CARDIOVASCULAR:(+)Chest pain. No shortness of breath.


RESPIRATORY: No cough, wheezing, or hemoptysis.


MUSCULOSKELETAL: (+)Left upper extremity pain. No neck or back pain.


SKIN: No rash


NEUROLOGIC: (+)Left upper extremity changes in sensation. No headache, vertigo, 

or loss of consciousness.


ENDOCRINE: No increased thirst. No abnormal weight change.


HEMATOLOGIC/LYMPHATIC: No anemia, easy bleeding, or history of blood clots.


ALLERGIC/IMMUNOLOGIC: No hives or skin allergy.





07/07/19 10:53





Is the patient limited English proficient: No


All Other Systems: Reviewed and Negative





*Physical Exam





- Vital Signs


 Last Vital Signs











Temp Pulse Resp BP Pulse Ox


 


 98.4 F   72   18   147/76   95 


 


 07/07/19 09:42  07/07/19 09:42  07/07/19 09:42  07/07/19 09:42  07/07/19 09:42














- Physical Exam


Comments: 


 Constitutional: Awake, alert, oriented.  No acute distress.


Head:  Normocephalic.  Atraumatic


Eyes:  PERRL. EOMI.  Conjunctivae are not pale.


ENT:  Mucous membranes are moist and intact. Posterior pharynx without exudates 

or erythema. Uvula midline.


Neck:  Supple.  Full ROM. No lymphadenopathy.


Cardiovascular:  Regular rate.  Regular rhythm. S1, S2 regular.  Distal pulses 

are 2+ and symmetric.  


Pulmonary/Chest:  No evidence of respiratory distress.  Clear to auscultation 

bilaterally  No wheezing, rales or rhonchi.


Abdominal: (+) Obese belly. Soft and non-distended.  There is no tenderness.  

No rebound, guarding or rigidity.  No organomegaly. No palpable masses. Good 

bowel sounds.


Back:  No CVA tenderness.


Musculoskeletal:  No edema.  No cyanosis.  No clubbing.  Full range of motion 

in all extremities.  Nocalf tenderness. Radial/pedal pulses are intact and 2+ 

bilaterally


Skin:  Skin is warm and dry.  No petechiae.  No purpura.  


Neurological:  Alert and oriented to person, place, and time.  Cranial nerves II

-XII are grossly intact. Normal speech. Strength is grossly symmetric. No 

sensory deficits.


Psychiatric:  Good eye contact.  Normal interaction, affect and behavior.


07/07/19 10:53








**Heart Score/ECG Review





- ECG Intrepretation


Comment:: 





07/07/19 11:54


sinus at 67, nl axis, nl interval, no acute st/t wave findings





ED Treatment Course





- LABORATORY


CBC & Chemistry Diagram: 


 07/07/19 10:24





 07/07/19 10:24





- ADDITIONAL ORDERS


Additional order review: 


 











  07/07/19





  10:24


 


RBC  4.99


 


MCV  85.1


 


MCHC  33.4


 


RDW  14.2


 


MPV  8.7


 


Neutrophils %  71.1


 


Lymphocytes %  18.5  D


 


Monocytes %  6.1


 


Eosinophils %  3.6  D


 


Basophils %  0.7














- RADIOLOGY


Radiology Studies Ordered: 














 Category Date Time Status


 


 CHEST PA & LAT [RAD] Stat Radiology  07/07/19 10:22 Completed














- Medications


Given in the ED: 


ED Medications














Discontinued Medications














Generic Name Dose Route Start Last Admin





  Trade Name Freq  PRN Reason Stop Dose Admin


 


Al Hydroxide/Mg Hydroxide  30 ml  07/07/19 10:32  07/07/19 10:46





  Mylanta Oral Suspension -  PO  07/07/19 10:33  30 ml





  ONCE ONE   Administration





     





     





     





     


 


Aspirin  324 mg  07/07/19 10:32  07/07/19 10:46





  Asa -  PO  07/07/19 10:33  324 mg





  ONCE ONE   Administration





     





     





     





     


 


Famotidine/Sodium Chloride  20 mg in 50 mls @ 100 mls/hr  07/07/19 10:32  07/07/ 19 10:59





  Pepcid 20 Mg Premixed Ivpb -  IVPB  07/07/19 11:01  100 mls/hr





  ONCE ONE   Administration





     





     





     





     














Medical Decision Making





- Medical Decision Making





07/07/19 11:54


a/p: 64yo male with hx of DM and HTN with cp today since 730a


-no abd pain, no sob


-radiates across the chest


-no nausea or diaphoresis


-concern for ACS - no prior episodes


-will send trop, ekg, cxr


-will give asa


07/07/19 12:03


cxr clear





07/07/19 12:24


trop negative


cxr clear


elevated glucose to 400 - nss ordered


07/07/19 12:27


pt currently resting, pain free


willing to stay for further eval


microblog sent to [facility] for obs placement for further eval of the cp this am


07/07/19 12:37


case discussed with Dr. Amanda who accepts pt to service





*DC/Admit/Observation/Transfer


Diagnosis at time of Disposition: 


 Chest pain








- Discharge Dispostion


Condition at time of disposition: Fair


Decision to Admit order: Yes





- Referrals


Referrals: 


Rosa Daniels MD [Primary Care Provider] - 





- Patient Instructions





- Post Discharge Activity





- Attestations


Physician Attestion: 





07/07/19 12:29








I, Dr. Shellie Ardon, DO, attest that this document has been prepared under 

my direction and personally reviewed by me in its entirety.   I further attest, 

that it accurately reflects all work, treatment, procedures and medical decision

-making performed by me.

## 2019-07-07 NOTE — HP
CHIEF COMPLAINT: chest pain





PCP: Dr. Daniels





HISTORY OF PRESENT ILLNESS:


63 yom with PMHx of HTN, HLD, NIDDM, Psoriasis, started on Methotrexate 1 week 

ago, active smoker, obesity, woke up this AM, around 7:30 AM had sudden of 

chest pain, generalized, overall chest wall area, radiating down left arm, 

associated with dyspnea and left hand numbness. Symptoms lasted about 15 min, 

resolved, but recurred,so came to ED, finally resolved over an hour after 

receiving ASA, Maalox, famotidine in the ED. Currently chest pain free.


Denies any nausea, vomiting, diaphoresis, radiation to back, similar prior 

symptoms, fevers, chills, new cough. 


Can walk upto 1 mile without any symptoms of chest pressure or dyspnea. 


Also unrelated left shoulder pain with movements which states is likely from 

sleeping on the side. 


Reports having lots of cherries and sweets yesterday. Does not check Blood 

sugars at home. Does not take ASA or lipitor though has been advised. 





Recent Travel: denies





PAST MEDICAL HISTORY:  HTN, HLD, NIDDM, Psoriasis, started on Methotrexate 1 

week ago





PAST SURGICAL HISTORY: Cholecystectomy, Appendectomy





Social History:


Smoking: Prior heavy smoker 2 PPD since age 14 , now 1/2 PPD


Alcohol: Denies


Drugs: Denies


Was , retired





Family History:


Allergies





No Known Allergies Allergy (Verified 07/07/19 09:44)


 








HOME MEDICATIONS:


 Home Medications











 Medication  Instructions  Recorded


 


Tamsulosin HCl [Flomax] 0.4 mg PO DAILY 06/21/19


 


Amlodipine Besylate/Benazepril 1 each PO DAILY 07/07/19





[Amlodipine-Benazepril 10-20 mg]  


 


Atorvastatin Ca [Lipitor] 10 mg PO HS 07/07/19


 


Folic Acid 1 mg PO DAILY 07/07/19


 


Methotrexate [Mexate -] 10 mg PO Q7D 07/07/19


 


Sitagliptin Phosphate [Januvia] 100 mg PO DAILY 07/07/19


 


metFORMIN HCL [Metformin HCl ER] 750 mg PO DAILY 07/07/19





 Intake & Output











 07/04/19 07/05/19 07/06/19 07/07/19





 23:59 23:59 23:59 23:59


 


Weight    188 lb











REVIEW OF SYSTEMS


12 point ROS done, per HPI





PHYSICAL EXAMINATION


 Vital Signs - 24 hr











  07/07/19 07/07/19





  09:42 13:37


 


Temperature 98.4 F 


 


Pulse Rate 72 


 


Pulse Rate [  67





Right Radial]  


 


Respiratory 18 18





Rate  


 


Blood Pressure 147/76 


 


Blood Pressure  133/82





[Left Arm]  


 


O2 Sat by Pulse 95 98





Oximetry (%)  








 Intake & Output











 07/04/19 07/05/19 07/06/19 07/07/19





 23:59 23:59 23:59 23:59


 


Weight    188 lb











GENERAL: Awake, alert, and fully oriented, in no acute distress.


HEAD: Normal with no signs of trauma.


EYES: Pupils equal, round and reactive to light, extraocular movements intact, 

sclera anicteric, conjunctiva clear. No lid lag.


EARS, NOSE, THROAT: Ears normal, nares patent, oropharynx clear without 

exudates. Moist mucous membranes.


NECK: Normal range of motion, soft, supple, thick neck limiting further exam


LUNGS: Breath sounds equal, clear to auscultation bilaterally. No wheezes, and 

no crackles. No accessory muscle use.


HEART: Regular rate and rhythm, normal S1 and S2 


ABDOMEN: Soft, nontender, not distended, normoactive bowel sounds, no guarding, 

no rebound, no masses.   


MUSCULOSKELETAL: Normal range of motion at all joints. No bony deformities or 

tenderness. No CVA tenderness.


UPPER EXTREMITIES: 2+ pulses, warm, well-perfused. No cyanosis. No clubbing. No 

peripheral edema. Full ROM Left shoulder, no point tenderness or erythema


LOWER EXTREMITIES: 2+ pulses, warm, well-perfused. No calf tenderness. No 

peripheral edema. 


NEUROLOGICAL:  AAOx3, power 5/5 sensation intact to light touch, Cranial nerves 

II-XII intact. Normal speech. Normal gait.


PSYCHIATRIC: Cooperative. Good eye contact. Appropriate mood and affect.


SKIN: Warm, dry, normal turgor, no rashes or lesions noted, normal capillary 

refill. 


 


 Laboratory Results - last 24 hr











  07/07/19 07/07/19 07/07/19





  10:24 10:24 10:24


 


WBC  9.0  


 


RBC  4.99  


 


Hgb  14.2  


 


Hct  42.4  


 


MCV  85.1  


 


MCH  28.5  


 


MCHC  33.4  


 


RDW  14.2  


 


Plt Count  223  


 


MPV  8.7  


 


Absolute Neuts (auto)  6.4  


 


Neutrophils %  71.1  


 


Lymphocytes %  18.5  D  


 


Monocytes %  6.1  


 


Eosinophils %  3.6  D  


 


Basophils %  0.7  


 


Nucleated RBC %  0  


 


PT with INR   10.40 


 


INR   0.88 


 


PTT (Actin FS)   32.0 


 


Sodium    137


 


Potassium    4.1


 


Chloride    103


 


Carbon Dioxide    29


 


Anion Gap    4 L


 


BUN    15.5


 


Creatinine    1.0


 


Est GFR (CKD-EPI)AfAm    91.13


 


Est GFR (CKD-EPI)NonAf    78.63


 


POC Glucometer   


 


Random Glucose    402 H*


 


Calcium    9.1


 


Magnesium    2.4


 


Total Bilirubin    0.3


 


AST    10 L


 


ALT    34


 


Alkaline Phosphatase    59


 


Creatine Kinase    36


 


Troponin I    < 0.02


 


B-Natriuretic Peptide    91.5


 


Total Protein    7.3


 


Albumin    3.8














  07/07/19





  13:35


 


WBC 


 


RBC 


 


Hgb 


 


Hct 


 


MCV 


 


MCH 


 


MCHC 


 


RDW 


 


Plt Count 


 


MPV 


 


Absolute Neuts (auto) 


 


Neutrophils % 


 


Lymphocytes % 


 


Monocytes % 


 


Eosinophils % 


 


Basophils % 


 


Nucleated RBC % 


 


PT with INR 


 


INR 


 


PTT (Actin FS) 


 


Sodium 


 


Potassium 


 


Chloride 


 


Carbon Dioxide 


 


Anion Gap 


 


BUN 


 


Creatinine 


 


Est GFR (CKD-EPI)AfAm 


 


Est GFR (CKD-EPI)NonAf 


 


POC Glucometer  238


 


Random Glucose 


 


Calcium 


 


Magnesium 


 


Total Bilirubin 


 


AST 


 


ALT 


 


Alkaline Phosphatase 


 


Creatine Kinase 


 


Troponin I 


 


B-Natriuretic Peptide 


 


Total Protein 


 


Albumin 





EKG - NSR, no acute ST-T changes


CXR results and images reviewed





ASSESSMENT/PLAN:


63 yom with PMHx of HTN, HLD, NIDDM, Psoriasis, started on Methotrexate 1 week 

ago, active smoker, obesity admitted with chest pain





-Chest pain with some typical features


-HTN


-HLD


-NIDDM


-Psoriasis


-Obesity


-Suspect LEDY





Plan:


Telemetry, r/o ACS


Given risk factors and some typical features, 2D echo, and stress test once ACS 

ruled out. 


ASA 81 mg daily. 


Non compliant with lipitor. Start 40 mg hs, check lipid panel.


Continue amlodipine/ACEI. Will benefit from beta blocker, will hold off pending 

stress test


Hold januvia/Metformin. ISS


Recently started on methotrexate, takes every Tuesday


patient advised outpatient sleep study


Smoking cessation counseling provided. 


DVTPPX lovenox


Dispo admit to tele observation


Plan discussed with patient in detail, all questions answered


Total admit time 65 min. 





Visit type





- Emergency Visit


Emergency Visit: Yes


ED Registration Date: 07/07/19


Care time: The patient presented to the Emergency Department on the above date 

and was hospitalized for further evaluation of their emergent condition.





- New Patient


This patient is new to me today: Yes


Date on this admission: 07/07/19





- Critical Care


Critical Care patient: No

## 2019-07-08 VITALS — SYSTOLIC BLOOD PRESSURE: 78 MMHG | DIASTOLIC BLOOD PRESSURE: 44 MMHG | HEART RATE: 42 BPM

## 2019-07-08 VITALS — TEMPERATURE: 98.1 F

## 2019-07-08 LAB
ANION GAP SERPL CALC-SCNC: 4 MMOL/L (ref 8–16)
BASOPHILS # BLD: 0.8 % (ref 0–2)
BUN SERPL-MCNC: 13.3 MG/DL (ref 7–18)
CALCIUM SERPL-MCNC: 9 MG/DL (ref 8.5–10.1)
CHLORIDE SERPL-SCNC: 103 MMOL/L (ref 98–107)
CHOLEST SERPL-MCNC: 201 MG/DL (ref 50–200)
CO2 SERPL-SCNC: 31 MMOL/L (ref 21–32)
CREAT SERPL-MCNC: 0.9 MG/DL (ref 0.55–1.3)
DEPRECATED RDW RBC AUTO: 14 % (ref 11.9–15.9)
EOSINOPHIL # BLD: 3.4 % (ref 0–4.5)
GLUCOSE SERPL-MCNC: 169 MG/DL (ref 74–106)
HCT VFR BLD CALC: 42.3 % (ref 35.4–49)
HDLC SERPL-MCNC: 44 MG/DL (ref 40–60)
HGB BLD-MCNC: 14.2 GM/DL (ref 11.7–16.9)
LYMPHOCYTES # BLD: 21.4 % (ref 8–40)
MAGNESIUM SERPL-MCNC: 2.4 MG/DL (ref 1.8–2.4)
MCH RBC QN AUTO: 28.3 PG (ref 25.7–33.7)
MCHC RBC AUTO-ENTMCNC: 33.6 G/DL (ref 32–35.9)
MCV RBC: 84.4 FL (ref 80–96)
MONOCYTES # BLD AUTO: 4.8 % (ref 3.8–10.2)
NEUTROPHILS # BLD: 69.6 % (ref 42.8–82.8)
PHOSPHATE SERPL-MCNC: 3.6 MG/DL (ref 2.5–4.9)
PLATELET # BLD AUTO: 228 K/MM3 (ref 134–434)
PMV BLD: 8.4 FL (ref 7.5–11.1)
POTASSIUM SERPLBLD-SCNC: 4.4 MMOL/L (ref 3.5–5.1)
RBC # BLD AUTO: 5.02 M/MM3 (ref 4–5.6)
SODIUM SERPL-SCNC: 139 MMOL/L (ref 136–145)
TRIGL SERPL-MCNC: 161 MG/DL (ref 0–150)
WBC # BLD AUTO: 9.1 K/MM3 (ref 4–10)

## 2019-07-08 RX ADMIN — INSULIN ASPART SCH: 100 INJECTION, SOLUTION INTRAVENOUS; SUBCUTANEOUS at 06:34

## 2019-07-08 NOTE — EKG
Test Reason : 

Blood Pressure : ***/*** mmHG

Vent. Rate : 049 BPM     Atrial Rate : 091 BPM

   P-R Int : 000 ms          QRS Dur : 100 ms

    QT Int : 480 ms       P-R-T Axes : 000 049 079 degrees

   QTc Int : 433 ms

 

JUNCTIONAL BRADYCARDIA

INCREASED R/S RATIO IN V1, CONSIDER EARLY TRANSITION OR POSTERIOR

INFARCT

INFERIOR INJURY PATTERN

** ** ACUTE MI / STEMI ** **

Consider right ventricular involvement in acute inferior infarct

ABNORMAL ECG

WHEN COMPARED WITH ECG OF 26-JAN-2018 17:44,

JUNCTIONAL RHYTHM HAS REPLACED SINUS RHYTHM

ST NOW DEPRESSED IN LATERAL LEADS

Patient emergently transferred for cath and underwent stent prox RCA

Confirmed by ISAURA AVELAR, JASMINE ([id number]) on 7/8/2019 11:14:33 AM

 

Referred By:             Confirmed By:JASMINE DELAROSA MD

## 2019-07-08 NOTE — DS
Physical Exam: 


SUBJECTIVE: Patient seen and examined. In acute distress. Chest pain now 7/10. 

Noted to be hypotensive, bradycardic, with STEMI in II, III, AVF








OBJECTIVE:





 Vital Signs











 Period  Temp  Pulse  Resp  BP Sys/Leon  Pulse Ox


 





 Last 24 Hr  97.8 F-98.8 F  58-72  18-18  133-147/75-85  95-98





 Vital Signs











Temperature  98.1 F   07/08/19 06:30


 


Pulse Rate  61   07/08/19 06:30


 


Respiratory Rate  18   07/08/19 06:30


 


Blood Pressure  137/75   07/08/19 06:30


 


O2 Sat by Pulse Oximetry (%)  97   07/08/19 06:00











PHYSICAL EXAM





GENERAL: The patient is awake, alert, and fully oriented, diaphoretic, in acute 

painful distressdistress.


LUNGS: Reduced Breath sounds b/l, 


HEART: S1, S2 , bradycardic


ABDOMEN: Obese


EXTREMITIES: 2+ pulses, warm, well-perfused, no edema. 


NEUROLOGICAL: Cranial nerves II through XII grossly intact. Normal speech, gait 

not observed.


SKIN: Diaphoretic


 CBC, BMP





 07/08/19 05:50 





 07/08/19 05:50 











LABS


 Laboratory Results - last 24 hr











  07/07/19 07/07/19 07/07/19





  10:24 10:24 10:24


 


WBC  9.0  


 


RBC  4.99  


 


Hgb  14.2  


 


Hct  42.4  


 


MCV  85.1  


 


MCH  28.5  


 


MCHC  33.4  


 


RDW  14.2  


 


Plt Count  223  


 


MPV  8.7  


 


Absolute Neuts (auto)  6.4  


 


Neutrophils %  71.1  


 


Lymphocytes %  18.5  D  


 


Monocytes %  6.1  


 


Eosinophils %  3.6  D  


 


Basophils %  0.7  


 


Nucleated RBC %  0  


 


PT with INR   10.40 


 


INR   0.88 


 


PTT (Actin FS)   32.0 


 


Sodium    137


 


Potassium    4.1


 


Chloride    103


 


Carbon Dioxide    29


 


Anion Gap    4 L


 


BUN    15.5


 


Creatinine    1.0


 


Est GFR (CKD-EPI)AfAm    91.13


 


Est GFR (CKD-EPI)NonAf    78.63


 


POC Glucometer   


 


Random Glucose    402 H*


 


Hemoglobin A1c %   


 


Calcium    9.1


 


Phosphorus   


 


Magnesium    2.4


 


Total Bilirubin    0.3


 


AST    10 L


 


ALT    34


 


Alkaline Phosphatase    59


 


Creatine Kinase    36


 


Troponin I    < 0.02


 


B-Natriuretic Peptide    91.5


 


Total Protein    7.3


 


Albumin    3.8


 


Triglycerides   


 


Cholesterol   


 


Total LDL Cholesterol   


 


HDL Cholesterol   


 


TSH   














  07/07/19 07/07/19 07/07/19





  13:35 16:49 17:04


 


WBC   


 


RBC   


 


Hgb   


 


Hct   


 


MCV   


 


MCH   


 


MCHC   


 


RDW   


 


Plt Count   


 


MPV   


 


Absolute Neuts (auto)   


 


Neutrophils %   


 


Lymphocytes %   


 


Monocytes %   


 


Eosinophils %   


 


Basophils %   


 


Nucleated RBC %   


 


PT with INR   


 


INR   


 


PTT (Actin FS)   


 


Sodium   


 


Potassium   


 


Chloride   


 


Carbon Dioxide   


 


Anion Gap   


 


BUN   


 


Creatinine   


 


Est GFR (CKD-EPI)AfAm   


 


Est GFR (CKD-EPI)NonAf   


 


POC Glucometer  238   233


 


Random Glucose   


 


Hemoglobin A1c %   


 


Calcium   


 


Phosphorus   


 


Magnesium   


 


Total Bilirubin   


 


AST   


 


ALT   


 


Alkaline Phosphatase   


 


Creatine Kinase   


 


Troponin I   0.03 


 


B-Natriuretic Peptide   


 


Total Protein   


 


Albumin   


 


Triglycerides   


 


Cholesterol   


 


Total LDL Cholesterol   


 


HDL Cholesterol   


 


TSH   














  07/07/19 07/07/19 07/08/19





  21:34 23:40 05:50


 


WBC    9.1


 


RBC    5.02


 


Hgb    14.2


 


Hct    42.3


 


MCV    84.4


 


MCH    28.3


 


MCHC    33.6


 


RDW    14.0


 


Plt Count    228


 


MPV    8.4


 


Absolute Neuts (auto)    6.3


 


Neutrophils %    69.6


 


Lymphocytes %    21.4


 


Monocytes %    4.8


 


Eosinophils %    3.4


 


Basophils %    0.8


 


Nucleated RBC %    0


 


PT with INR   


 


INR   


 


PTT (Actin FS)   


 


Sodium   


 


Potassium   


 


Chloride   


 


Carbon Dioxide   


 


Anion Gap   


 


BUN   


 


Creatinine   


 


Est GFR (CKD-EPI)AfAm   


 


Est GFR (CKD-EPI)NonAf   


 


POC Glucometer  228  


 


Random Glucose   


 


Hemoglobin A1c %   


 


Calcium   


 


Phosphorus   


 


Magnesium   


 


Total Bilirubin   


 


AST   


 


ALT   


 


Alkaline Phosphatase   


 


Creatine Kinase   


 


Troponin I   0.03 


 


B-Natriuretic Peptide   


 


Total Protein   


 


Albumin   


 


Triglycerides   


 


Cholesterol   


 


Total LDL Cholesterol   


 


HDL Cholesterol   


 


TSH   














  07/08/19 07/08/19 07/08/19





  05:50 05:50 05:50


 


WBC   


 


RBC   


 


Hgb   


 


Hct   


 


MCV   


 


MCH   


 


MCHC   


 


RDW   


 


Plt Count   


 


MPV   


 


Absolute Neuts (auto)   


 


Neutrophils %   


 


Lymphocytes %   


 


Monocytes %   


 


Eosinophils %   


 


Basophils %   


 


Nucleated RBC %   


 


PT with INR   


 


INR   


 


PTT (Actin FS)   


 


Sodium  139  


 


Potassium  4.4  


 


Chloride  103  


 


Carbon Dioxide  31  


 


Anion Gap  4 L  


 


BUN  13.3  


 


Creatinine  0.9  


 


Est GFR (CKD-EPI)AfAm  103.51  


 


Est GFR (CKD-EPI)NonAf  89.31  


 


POC Glucometer   


 


Random Glucose  169 H  


 


Hemoglobin A1c %   8.3 H 


 


Calcium  9.0  


 


Phosphorus  3.6  


 


Magnesium  2.4  


 


Total Bilirubin   


 


AST   


 


ALT   


 


Alkaline Phosphatase   


 


Creatine Kinase    34


 


Troponin I    0.03


 


B-Natriuretic Peptide   


 


Total Protein   


 


Albumin   


 


Triglycerides  161 H  


 


Cholesterol  201 H  


 


Total LDL Cholesterol  135 H  


 


HDL Cholesterol  44  


 


TSH  3.25  














  07/08/19 07/08/19 07/08/19





  06:33 07:28 08:15


 


WBC   


 


RBC   


 


Hgb   


 


Hct   


 


MCV   


 


MCH   


 


MCHC   


 


RDW   


 


Plt Count   


 


MPV   


 


Absolute Neuts (auto)   


 


Neutrophils %   


 


Lymphocytes %   


 


Monocytes %   


 


Eosinophils %   


 


Basophils %   


 


Nucleated RBC %   


 


PT with INR   


 


INR   


 


PTT (Actin FS)   


 


Sodium   


 


Potassium   


 


Chloride   


 


Carbon Dioxide   


 


Anion Gap   


 


BUN   


 


Creatinine   


 


Est GFR (CKD-EPI)AfAm   


 


Est GFR (CKD-EPI)NonAf   


 


POC Glucometer  168  156  275


 


Random Glucose   


 


Hemoglobin A1c %   


 


Calcium   


 


Phosphorus   


 


Magnesium   


 


Total Bilirubin   


 


AST   


 


ALT   


 


Alkaline Phosphatase   


 


Creatine Kinase   


 


Troponin I   


 


B-Natriuretic Peptide   


 


Total Protein   


 


Albumin   


 


Triglycerides   


 


Cholesterol   


 


Total LDL Cholesterol   


 


HDL Cholesterol   


 


TSH   











Ambulatory Orders





Tamsulosin HCl [Flomax] 0.4 mg PO DAILY 06/21/19 


Amlodipine Besylate/Benazepril [Amlodipine-Benazepril 10-20 mg] 1 each PO DAILY 

07/07/19 


Atorvastatin Ca [Lipitor] 10 mg PO HS 07/07/19 


Folic Acid 1 mg PO DAILY 07/07/19 


Methotrexate [Mexate -] 10 mg PO Q7D 07/07/19 


Sitagliptin Phosphate [Januvia] 100 mg PO DAILY 07/07/19 


metFORMIN HCL [Metformin HCl ER] 750 mg PO DAILY 07/07/19 





 Current Medications





Amlodipine Besylate (Norvasc -)  10 mg PO DAILY On license of [facility]


Aspirin (Ecotrin -)  81 mg PO DAILY On license of [facility]


Aspirin (Asa -)  325 mg PO ONCE ONE


   Stop: 07/08/19 07:57


   Last Admin: 07/08/19 07:57 Dose:  325 mg


Atorvastatin Calcium (Lipitor -)  40 mg PO HS On license of [facility]


   Last Admin: 07/07/19 21:35 Dose:  40 mg


Clopidogrel Bisulfate (Plavix -)  300 mg PO ONCE ONE


   Stop: 07/08/19 08:12


   Last Admin: 07/08/19 08:13 Dose:  300 mg


Folic Acid (Folic Acid -)  1 mg PO DAILY On license of [facility]


Heparin Sodium (Porcine) (Heparin -)  1,000 unit IVPUSH PRN PRN


   PRN Reason: Heparin


Heparin Sodium (Porcine) (Heparin -)  5,000 unit IVPUSH PRN PRN


   PRN Reason: Heparin


Heparin Sodium (Porcine) (Heparin -)  1,000 unit IVPUSH PRN PRN


   PRN Reason: Heparin


Heparin Sodium (Porcine) (Heparin -)  5,000 unit IVPUSH PRN PRN


   PRN Reason: Heparin


   Last Admin: 07/08/19 08:02 Dose:  5,000 unit


Heparin Sodium/Dextrose (Heparin Infusion -)  500 mls @ 20 mls/hr IV TITR JJ; 

Protocol


Heparin Sodium (Porcine) 25, (000 unit/ Sodium Chloride)  500 mls @ 20 mls/hr 

IV TITR JJ; Protocol


   Last Admin: 07/08/19 08:03 Dose:  1,000 unit/hr, 20 mls/hr


Insulin Aspart (Novolog Vial Sliding Scale -)  0 vial SQ ACHS JJ; Protocol


   Last Admin: 07/08/19 06:34 Dose:  Not Given


Lisinopril (Prinivil)  20 mg PO DAILY JJ


Morphine Sulfate (Morphine Injection -)  1 mg IVPUSH ONCE ONE


   Stop: 07/08/19 08:20


Tamsulosin HCl (Flomax -)  0.4 mg PO HS JJ


   Last Admin: 07/07/19 21:35 Dose:  0.4 mg











HOSPITAL COURSE:





Date of Admission:07/07/19





Date of Discharge: 07/08/19





63 yom with PMHx of HTN, HLD, NIDDM, Psoriasis, started on Methotrexate 1 week 

ago, active smoker, obesity, woke up this AM, around 7:30 AM had sudden of 

chest pain, generalized, overall chest wall area, radiating down left arm, 

associated with dyspnea and left hand numbness. Chest pain resolved after 

arrival in ED. This am, after using the bathroom, pt developed new chest pain 

that persisted until her was transferred to [facility] for Cath. Pt was 

bradycardic and hypotensive and received IVF, , PlaVIX 600MG and 1mg 

morphine. Cardiologist on call was consulted.











Minutes to complete discharge: 45





Discharge Summary


Reason For Visit: CHEST PAIN


Current Active Problems





Chest pain (Acute)








Condition: Critical





- Instructions


Diet, Activity, Other Instructions: 


Transferred to [facility] for cath due to inferior posterior STEMI





Pt received 


Plavix 6000mg


Heparin drip started


Was [placed on oxygen





Home meds:


Tamsulosin HCl [Flomax] 0.4 mg PO DAILY 06/21/19 


Amlodipine Besylate/Benazepril [Amlodipine-Benazepril 10-20 mg] 1 each PO DAILY 

07/07/19 


Atorvastatin Ca [Lipitor] 10 mg PO HS 07/07/19 


Folic Acid 1 mg PO DAILY 07/07/19 


Methotrexate [Mexate -] 10 mg PO Q7D 07/07/19 


Sitagliptin Phosphate [Januvia] 100 mg PO DAILY 07/07/19 


metFORMIN HCL [Metformin HCl ER] 750 mg PO DAILY 07/07/19 





 Current Medications





Amlodipine Besylate (Norvasc -)  10 mg PO DAILY On license of [facility]


Aspirin (Ecotrin -)  81 mg PO DAILY On license of [facility]


Aspirin (Asa -)  325 mg PO ONCE ONE


   Stop: 07/08/19 07:57


   Last Admin: 07/08/19 07:57 Dose:  325 mg


Atorvastatin Calcium (Lipitor -)  40 mg PO HS On license of [facility]


   Last Admin: 07/07/19 21:35 Dose:  40 mg


Clopidogrel Bisulfate (Plavix -)  300 mg PO ONCE ONE


   Stop: 07/08/19 08:12


   Last Admin: 07/08/19 08:13 Dose:  300 mg


Folic Acid (Folic Acid -)  1 mg PO DAILY On license of [facility]


Heparin Sodium (Porcine) (Heparin -)  1,000 unit IVPUSH PRN PRN


   PRN Reason: Heparin


Heparin Sodium (Porcine) (Heparin -)  5,000 unit IVPUSH PRN PRN


   PRN Reason: Heparin


Heparin Sodium (Porcine) (Heparin -)  1,000 unit IVPUSH PRN PRN


   PRN Reason: Heparin


Heparin Sodium (Porcine) (Heparin -)  5,000 unit IVPUSH PRN PRN


   PRN Reason: Heparin


   Last Admin: 07/08/19 08:02 Dose:  5,000 unit


Heparin Sodium/Dextrose (Heparin Infusion -)  500 mls @ 20 mls/hr IV TITR JJ; 

Protocol


Heparin Sodium (Porcine) 25, (000 unit/ Sodium Chloride)  500 mls @ 20 mls/hr 

IV TITR JJ; Protocol


   Last Admin: 07/08/19 08:03 Dose:  1,000 unit/hr, 20 mls/hr


Insulin Aspart (Novolog Vial Sliding Scale -)  0 vial SQ ACHS JJ; Protocol


   Last Admin: 07/08/19 06:34 Dose:  Not Given


Lisinopril (Prinivil)  20 mg PO DAILY On license of [facility]


Morphine Sulfate (Morphine Injection -)  1 mg IVPUSH ONCE ONE


   Stop: 07/08/19 08:20


Tamsulosin HCl (Flomax -)  0.4 mg PO HS On license of [facility]


   Last Admin: 07/07/19 21:35 Dose:  0.4 mg





Disposition: TRANSFER ACUTE CARE/OTHER HOSP





- Home Medications


Comprehensive Discharge Medication List: 


Ambulatory Orders





Tamsulosin HCl [Flomax] 0.4 mg PO DAILY 06/21/19 


Amlodipine Besylate/Benazepril [Amlodipine-Benazepril 10-20 mg] 1 each PO DAILY 

07/07/19 


Atorvastatin Ca [Lipitor] 10 mg PO HS 07/07/19 


Folic Acid 1 mg PO DAILY 07/07/19 


Methotrexate [Mexate -] 10 mg PO Q7D 07/07/19 


Sitagliptin Phosphate [Januvia] 100 mg PO DAILY 07/07/19 


metFORMIN HCL [Metformin HCl ER] 750 mg PO DAILY 07/07/19 








This patient is new to me today: Yes


Date on this admission: 07/08/19


Emergency Visit: Yes


ED Registration Date: 07/07/19


Care time: The patient presented to the Emergency Department on the above date 

and was hospitalized for further evaluation of their emergent condition.


Critical Care patient: Yes


Total Critical Care Time (in minutes): 40


Critical Care Statement: The care of this patient involved high complexity 

decision making to prevent further life threatening deterioration of the patient

's condition and/or to evaluate & treat vital organ system(s) failure or risk 

of failure.





- Discharge Referral


Referred to [facility] No





ATTENDING PHYSICIAN STATEMENT





I saw and evaluated the patient.


I reviewed the resident's note and discussed the case with the resident.


I agree with the resident's findings and plan as documented.








SUBJECTIVE:








OBJECTIVE:








ASSESSMENT AND PLAN:

## 2019-07-08 NOTE — PN
Progress Note (short form)





- Note


Progress Note: 





Rapid response called 7:25 AM. patient with chest pain, EKG with inferior 

STEMI. HR 40s, SBP 80s. Cardiology on call Dr. Li contact STAT.  ASA 

325 mg and plavix 600 mg loading started on heparin drip. IVF wide open. Right 

sided EKG confirmed inferoposterior STEMI. 


[facility] transfer center contacted, case discussed with [facility] cardiac cath 

PA, patient for STAT transfer to [facility] cardiac cath lab. 


BP improved to 100/60 prior to transfer, Ongoing chest pain, small dose IV 

morphine administered.


patient transferred via ambulance. Total critical care time spent at bedside 55 

min. 





Visit type





- Emergency Visit


Emergency Visit: Yes


ED Registration Date: 07/07/19


Care time: The patient presented to the Emergency Department on the above date 

and was hospitalized for further evaluation of their emergent condition.





- New Patient


This patient is new to me today: No





- Critical Care


Critical Care patient: Yes


Total Critical Care Time (in minutes): 55


Critical Care Statement: The care of this patient involved high complexity 

decision making to prevent further life threatening deterioration of the patient

's condition and/or to evaluate & treat vital organ system(s) failure or risk 

of failure.





- Discharge Referral


Referred to [facility] No

## 2019-07-08 NOTE — RAPID
Physical Examination


Vital Signs: 


 Vital Signs











Temperature  98.1 F   07/08/19 06:30


 


Pulse Rate  61   07/08/19 06:30


 


Respiratory Rate  18   07/08/19 06:30


 


Blood Pressure  137/75   07/08/19 06:30


 


O2 Sat by Pulse Oximetry (%)  97   07/08/19 06:00














Rapid Response





- Rapid Response


Assessment: 





Subjective: Rapid rsponse called overhead at 0728. On call team responded 

immediately. Alerted by nursing staff that patient found to be bradycardic & 

hypotensive. Pt c/o sudden onset left sided chest pain, described as a burning 

pressure. Additionally c/o sweating. Denies SOB. 





Objective:


Initial VS; HR 41   78/44   SpO2 93%


General: AOx3. Moderate acute distress. Diaphoretic


Cardio: Bradycardic. S1S2 heard. 


Lungs: CTABL. 


Extr: No edema





A/P:


#Chest pain


-Concern for STEMI/ NSTEMI--Must r/o ACS


-EKG reveals ST seg elevation in leads II, V1-V3


-Stat trop drawn


-ASA 325mg PO, Clopidogrel 600mg given x 1


-Heparin drip started


-1L NS bolus given wide open


-Dr. Li made aware--recs transfer for cardiac cath


-Call placed to [facility] by Dr. Amanda Primary team; case d/w 

cardiac PA








Repeat VS: HR 39  70/50   SpO2 96%


Repeat VS: HR 43  71/39   


Repeat VS: HR 48 88/38

## 2019-07-08 NOTE — CON.CARD
Consult


Consult Specialty:: Cardiology


Referred by:: Dr. Amanda


Reason for Consultation:: Acute Inferior STEMI





- History of Present Illness


Chief Complaint: chest pain


History of Present Illness: 





63 year old male with PMHx of HTN, HLD, NIDDM, Psoriasis, started on 

Methotrexate 1 week ago, active smoker, obesity, woke up 7/7 with chest pain- 

had sudden of chest pain, generalized, overall chest wall area, radiating down 

left arm, associated with dyspnea and left hand numbness. Symptoms lasted about 

15 min, resolved, but recurred,so came to ED, finally resolved over an hour 

after receiving ASA, Maalox, famotidine in the ED. Currently chest pain free.


Denies any nausea, vomiting, diaphoresis, radiation to back, similar prior 

symptoms, fevers, chills, new cough. 


Usually walks 1 mile without any symptoms of chest pressure or dyspnea. 


Also unrelated left shoulder pain with movements which states is likely from 

sleeping on the side. 








This AM (7/8) shortly after 7AM developed severe substernal chest pain. Repeat 

ECG revealed Sinus desirae vs junctional rhythm with inferior ST elevation and ST 

depression in I, avL new from the initial ECG.





Hospitalist and nursing staff attended to patient: administered nasal can O2, 

ASA, heparin gtts and 300mg Plavix.


[facility] lab activated.





Upon my arrival on floor at around 7:50 AM, was called to patient room. Active 

chest pain, ECGs reviewed.


BP stable, IV fluids ordered to continue and right sided ECG requested.


Right sided ECG performed and showed elevation in RV4, indicative of RV 

involvement.





Instructed staff and EMS (arrived) to avoid nitroglycerin.











- History Source


History Provided By: Patient, Medical Record





- Past Medical History


Cardio/Vascular: Yes: HTN


Gastrointestinal: Yes: Other (umbilical hernia)


Hepatobiliary: No: Cirrhosis, Cholelithiasis, Cholecystitis, Choledocholithiasis

, Hepatitis A, Hepatitis B, Hepatitis C, Other


Renal/: No: Renal Failure, Renal Inusuff, BPH, Cancer, Hematuria, Hemodialysis

, Neurogenic Bladder, Renal Calculi, UTI, Other


Heme/Onc: No: Anemia, B12 Deficiency, Bleeding Disorder, Cancer, Current 

Chemotherapy, Current Radiation Therapy, Hemochromatosis, Hypercoaguable State, 

Myeloproliferative Synd, Sickle Cell Disease, Sickle Cell Trait, 

Thrombocytopenia, Other


Infectious Disease: No: AIDS, C-Diff, Herpes Zoster, HIV, MRSA, STD's, 

Tuberculosis, VREF, Other


Psych: No: Addictions, Anxiety, Bipolar, Depression, Panic, Psychosis, 

Schizophrenia, Other


Musculoskeletal: No: Bursitis, Chronic low back pain, Hemiparesis, Hemiplegia, 

Osteoarthritis, Paraplegia, Other


Rheumatology: No: Fibromyalgia, Gout, Lupus, Rheumatoid Arthritis, Sarcoidosis, 

Vasculitis, Other


Endocrine: Yes: Diabetes Mellitus


Dermatology: Yes: Psoriasis





- Alcohol/Substance Use


Hx Alcohol Use: No





- Smoking History


Smoking history: Current every day smoker


Have you smoked in the past 12 months: Yes


Aproximately how many cigarettes per day: 10


If you are a former smoker, when did you quit?: 2 weeks ago





- Social History


ADL: Independent





Home Medications





- Allergies


Allergies/Adverse Reactions: 


 Allergies











Allergy/AdvReac Type Severity Reaction Status Date / Time


 


No Known Allergies Allergy   Verified 07/07/19 09:44














- Home Medications


Home Medications: 


Ambulatory Orders





Tamsulosin HCl [Flomax] 0.4 mg PO DAILY 06/21/19 


Amlodipine Besylate/Benazepril [Amlodipine-Benazepril 10-20 mg] 1 each PO DAILY 

07/07/19 


Atorvastatin Ca [Lipitor] 10 mg PO HS 07/07/19 


Folic Acid 1 mg PO DAILY 07/07/19 


Methotrexate [Mexate -] 10 mg PO Q7D 07/07/19 


Sitagliptin Phosphate [Januvia] 100 mg PO DAILY 07/07/19 


metFORMIN HCL [Metformin HCl ER] 750 mg PO DAILY 07/07/19 











Family Disease History





- Family Disease History


Family Disease History: CA: Brother (Unknown type)





Review of Systems


Findings/Remarks: 





see HPI





- Review of Systems


Constitutional: reports: No Symptoms


Eyes: reports: No Symptoms


HENT: reports: No Symptoms


Cardiovascular: reports: Chest Pain


Respiratory: denies: No Symptoms, Cough, Exercise Intolerance, Hemoptysis, 

Orthopnea, PND, Snoring, SOB, SOB on Exertion, Wheezing, Other


Gastrointestinal: denies: No Symptoms, Abdominal Pain, Bloating, Constipation, 

Diarrhea, Dysphagia, Indigestion, Melena, Nausea, Rectal Bleeding, Vomiting, 

Vomiting Blood, Other


Genitourinary: denies: No Symptoms, Burning, Discharge, Dysuria, Flank Pain, 

Frequency, Hematuria, Incontinence, Lesions, Menses, Pain, Testicular Mass, 

Testicular Pain, Testicular Swelling, Urgency, Vaginal Bleeding, Other


Breasts: denies: No Symptoms Reported, See HPI, Breast Implants, Discharge from 

Nipple, Lumps, Pain, Skin Changes, Other


Musculoskeletal: denies: No Symptoms, Back Pain, Crepitus, Decreased ROM, 

Extremity Pain, Joint Pain, Joint Swelling, Muscle Pain, Muscle Cramps, Muscle 

Weakness, Other


Integumentary: denies: No Symptoms, Blister, Bruising, Change in Color, Eczema, 

Erythema, Incision, Lesions, Lump, Pallor, Pruritis, Rash, Wound, Other


Neurological: denies: No Symptoms, Change in LOC, Change in Speech, Confusion, 

Dizziness, Headache, Incoordination, Numbness, Parasthesia, Pre-Existing Deficit

, Seizure, Syncope, Tremors, Unsteady Gait, Weakness, Other


Endocrine: denies: No Symptoms, Excessive Sweating, Flushing, Increased Hunger, 

Increased Thirst, Intolerance to Cold, Intolerance to Heat, Unexplained Weight 

Gain, Unexplained Weight Loss, Other


Hematology/Lymphatic: denies: No Symptoms, Easily Bruised, Excessive Bleeding, 

Swollen Glands, Other


Psychiatric: denies: No Symptoms, Altered Sleep Pattern, Anxiety, Depression, 

Hallucinations, Panic, Paranoia, Suicidal, Other





- Risk Factors


Known Risk Factors: Yes: Diabetes Mellitus, Hypercholesterolemia, Hypertension, 

Smoking


Vital Signs: 


 Vital Signs











Temperature  98.1 F   07/08/19 06:30


 


Pulse Rate  42 L  07/08/19 07:40


 


Respiratory Rate  20   07/08/19 07:40


 


Blood Pressure  78/44 L  07/08/19 07:40


 


O2 Sat by Pulse Oximetry (%)  97   07/08/19 06:00











Constitutional: Yes: Anxious


Eyes: Yes: Conjunctiva Clear, EOM Intact


HENT: Yes: Atraumatic, Normocephalic


Respiratory: Yes: CTA Bilaterally (no rales)


Gastrointestinal: Yes: Soft, Abdomen, Obese


Cardiovascular: Yes: Regular Rate and Rhythm


JVD: No


Carotid Bruit: No


PMI: Non-Displaced


Heart Sounds: Yes: S1, S2 (RRR, no M/R/G)


Edema: No (warm)


Peripheral Pulses WNL: Yes


Neurological: Yes: Alert, Oriented


...Motor Strength: WNL





- Other Data


Labs, Other Data: 


 CBC, BMP





 07/08/19 05:50 





 07/08/19 05:50 





 INR, PTT











INR  0.88  (0.83-1.09)   07/07/19  10:24    








 Troponin, BNP











  07/07/19 07/07/19 07/07/19





  10:24 16:49 23:40


 


Troponin I  < 0.02  0.03  0.03


 


B-Natriuretic Peptide  91.5  














  07/08/19





  05:50


 


Troponin I  0.03


 


B-Natriuretic Peptide 








 Troponin, BNP











  07/07/19 07/07/19 07/07/19





  10:24 16:49 23:40


 


Troponin I  < 0.02  0.03  0.03


 


B-Natriuretic Peptide  91.5  














  07/08/19





  05:50


 


Troponin I  0.03


 


B-Natriuretic Peptide 














see HPI for evolution





Original presenting ECGL


NSR 67bpm. nl axis, intervals. No acute ST changes


Echo: Pending





Imaging





- Results


Chest X-ray: Report Reviewed, Image Reviewed


EKG: Image Reviewed





Assessment/Plan





IMP:


1. Acute inferior STEMI w/ RV involvement


2. History of HTN, HLD and DM





REC:


Patient was given Aspirin, IV heparin gtts, Plavix load and supplimental O2 as 

per guidelines.


IV fluids were given to maintain BP, in setting of RV involvement.


Patient remained hemodynamically stable with no sig arrhythmias and was 

transferred to [facility] cath lab emergently for PCI.


As per Dr. Amanda, who has received report, acute prox RCA lesion successfully 

stented.





45 minutes spent in reviewing history, physical exam, acute orders and 

assisting EMS in the stable transfer of this patient.

## 2020-06-08 ENCOUNTER — HOSPITAL ENCOUNTER (OUTPATIENT)
Dept: HOSPITAL 74 - JER | Age: 65
Setting detail: OBSERVATION
LOS: 2 days | Discharge: HOME | End: 2020-06-10
Attending: INTERNAL MEDICINE | Admitting: INTERNAL MEDICINE
Payer: COMMERCIAL

## 2020-06-08 VITALS — BODY MASS INDEX: 31 KG/M2

## 2020-06-08 DIAGNOSIS — Z79.82: ICD-10-CM

## 2020-06-08 DIAGNOSIS — Z79.02: ICD-10-CM

## 2020-06-08 DIAGNOSIS — N39.0: ICD-10-CM

## 2020-06-08 DIAGNOSIS — I10: ICD-10-CM

## 2020-06-08 DIAGNOSIS — Z95.5: ICD-10-CM

## 2020-06-08 DIAGNOSIS — E66.9: ICD-10-CM

## 2020-06-08 DIAGNOSIS — F17.210: ICD-10-CM

## 2020-06-08 DIAGNOSIS — I25.2: ICD-10-CM

## 2020-06-08 DIAGNOSIS — I25.10: ICD-10-CM

## 2020-06-08 DIAGNOSIS — Z79.84: ICD-10-CM

## 2020-06-08 DIAGNOSIS — R07.89: Primary | ICD-10-CM

## 2020-06-08 DIAGNOSIS — L40.9: ICD-10-CM

## 2020-06-08 DIAGNOSIS — E78.5: ICD-10-CM

## 2020-06-08 LAB
ALBUMIN SERPL-MCNC: 3.9 G/DL (ref 3.4–5)
ALP SERPL-CCNC: 69 U/L (ref 45–117)
ALT SERPL-CCNC: 42 U/L (ref 13–61)
ANION GAP SERPL CALC-SCNC: 8 MMOL/L (ref 8–16)
APPEARANCE UR: CLEAR
APTT BLD: 30.8 SECONDS (ref 25.2–36.5)
AST SERPL-CCNC: 27 U/L (ref 15–37)
BACTERIA # UR AUTO: 1292 /UL (ref 0–1359)
BASOPHILS # BLD: 0.4 % (ref 0–2)
BILIRUB SERPL-MCNC: 0.6 MG/DL (ref 0.2–1)
BILIRUB UR STRIP.AUTO-MCNC: NEGATIVE MG/DL
BUN SERPL-MCNC: 17 MG/DL (ref 7–18)
CALCIUM SERPL-MCNC: 8.9 MG/DL (ref 8.5–10.1)
CASTS URNS QL MICRO: 3 /UL (ref 0–3.1)
CHLORIDE SERPL-SCNC: 107 MMOL/L (ref 98–107)
CO2 SERPL-SCNC: 23 MMOL/L (ref 21–32)
COLOR UR: YELLOW
CREAT SERPL-MCNC: 1 MG/DL (ref 0.55–1.3)
DEPRECATED RDW RBC AUTO: 15 % (ref 11.9–15.9)
EOSINOPHIL # BLD: 0.6 % (ref 0–4.5)
EPITH CASTS URNS QL MICRO: 7 /UL (ref 0–25.1)
GLUCOSE SERPL-MCNC: 124 MG/DL (ref 74–106)
HCT VFR BLD CALC: 40.7 % (ref 35.4–49)
HGB BLD-MCNC: 13.6 GM/DL (ref 11.7–16.9)
INR BLD: 1.09 (ref 0.83–1.09)
KETONES UR QL STRIP: NEGATIVE
LEUKOCYTE ESTERASE UR QL STRIP.AUTO: NEGATIVE
LYMPHOCYTES # BLD: 9.2 % (ref 8–40)
MAGNESIUM SERPL-MCNC: 2 MG/DL (ref 1.8–2.4)
MCH RBC QN AUTO: 27.9 PG (ref 25.7–33.7)
MCHC RBC AUTO-ENTMCNC: 33.4 G/DL (ref 32–35.9)
MCV RBC: 83.4 FL (ref 80–96)
MONOCYTES # BLD AUTO: 7.3 % (ref 3.8–10.2)
NEUTROPHILS # BLD: 82.5 % (ref 42.8–82.8)
NITRITE UR QL STRIP: NEGATIVE
PH UR: 5 [PH] (ref 5–8)
PLATELET # BLD AUTO: 225 K/MM3 (ref 134–434)
PMV BLD: 8.8 FL (ref 7.5–11.1)
POTASSIUM SERPLBLD-SCNC: 3.6 MMOL/L (ref 3.5–5.1)
PROT SERPL-MCNC: 7.7 G/DL (ref 6.4–8.2)
PROT UR QL STRIP: (no result)
PROT UR QL STRIP: NEGATIVE
PT PNL PPP: 12.9 SEC (ref 9.7–13)
RBC # BLD AUTO: 12 /UL (ref 0–23.9)
RBC # BLD AUTO: 4.88 M/MM3 (ref 4–5.6)
SODIUM SERPL-SCNC: 137 MMOL/L (ref 136–145)
SP GR UR: 1.02 (ref 1.01–1.03)
UROBILINOGEN UR STRIP-MCNC: 0.2 MG/DL (ref 0.2–1)
WBC # BLD AUTO: 12.3 K/MM3 (ref 4–10)
WBC # UR AUTO: 8 /UL (ref 0–25.8)

## 2020-06-08 PROCEDURE — U0003 INFECTIOUS AGENT DETECTION BY NUCLEIC ACID (DNA OR RNA); SEVERE ACUTE RESPIRATORY SYNDROME CORONAVIRUS 2 (SARS-COV-2) (CORONAVIRUS DISEASE [COVID-19]), AMPLIFIED PROBE TECHNIQUE, MAKING USE OF HIGH THROUGHPUT TECHNOLOGIES AS DESCRIBED BY CMS-2020-01-R: HCPCS

## 2020-06-08 PROCEDURE — 3E03329 INTRODUCTION OF OTHER ANTI-INFECTIVE INTO PERIPHERAL VEIN, PERCUTANEOUS APPROACH: ICD-10-PCS | Performed by: INTERNAL MEDICINE

## 2020-06-08 PROCEDURE — G0378 HOSPITAL OBSERVATION PER HR: HCPCS

## 2020-06-08 PROCEDURE — 3E013GC INTRODUCTION OF OTHER THERAPEUTIC SUBSTANCE INTO SUBCUTANEOUS TISSUE, PERCUTANEOUS APPROACH: ICD-10-PCS | Performed by: INTERNAL MEDICINE

## 2020-06-09 LAB
ALBUMIN SERPL-MCNC: 3.3 G/DL (ref 3.4–5)
ALP SERPL-CCNC: 58 U/L (ref 45–117)
ALT SERPL-CCNC: 36 U/L (ref 13–61)
ANION GAP SERPL CALC-SCNC: 8 MMOL/L (ref 8–16)
AST SERPL-CCNC: 15 U/L (ref 15–37)
BASOPHILS # BLD: 0.6 % (ref 0–2)
BILIRUB SERPL-MCNC: 0.6 MG/DL (ref 0.2–1)
BUN SERPL-MCNC: 16 MG/DL (ref 7–18)
CALCIUM SERPL-MCNC: 8.6 MG/DL (ref 8.5–10.1)
CHLORIDE SERPL-SCNC: 107 MMOL/L (ref 98–107)
CO2 SERPL-SCNC: 26 MMOL/L (ref 21–32)
CREAT SERPL-MCNC: 0.8 MG/DL (ref 0.55–1.3)
DEPRECATED RDW RBC AUTO: 14.9 % (ref 11.9–15.9)
EOSINOPHIL # BLD: 2.4 % (ref 0–4.5)
GLUCOSE SERPL-MCNC: 112 MG/DL (ref 74–106)
HCT VFR BLD CALC: 37.5 % (ref 35.4–49)
HGB BLD-MCNC: 12.9 GM/DL (ref 11.7–16.9)
LYMPHOCYTES # BLD: 13 % (ref 8–40)
MCH RBC QN AUTO: 28.5 PG (ref 25.7–33.7)
MCHC RBC AUTO-ENTMCNC: 34.3 G/DL (ref 32–35.9)
MCV RBC: 83 FL (ref 80–96)
MONOCYTES # BLD AUTO: 9.7 % (ref 3.8–10.2)
NEUTROPHILS # BLD: 74.3 % (ref 42.8–82.8)
PLATELET # BLD AUTO: 207 K/MM3 (ref 134–434)
PMV BLD: 8.9 FL (ref 7.5–11.1)
POTASSIUM SERPLBLD-SCNC: 3.4 MMOL/L (ref 3.5–5.1)
PROT SERPL-MCNC: 6.9 G/DL (ref 6.4–8.2)
RBC # BLD AUTO: 4.51 M/MM3 (ref 4–5.6)
SODIUM SERPL-SCNC: 141 MMOL/L (ref 136–145)
WBC # BLD AUTO: 9.4 K/MM3 (ref 4–10)

## 2020-06-09 RX ADMIN — HEPARIN SODIUM SCH UNIT: 5000 INJECTION, SOLUTION INTRAVENOUS; SUBCUTANEOUS at 09:09

## 2020-06-09 RX ADMIN — ASPIRIN SCH MG: 81 TABLET, COATED ORAL at 09:09

## 2020-06-09 RX ADMIN — TAMSULOSIN HYDROCHLORIDE SCH MG: 0.4 CAPSULE ORAL at 09:09

## 2020-06-09 RX ADMIN — AMLODIPINE BESYLATE SCH MG: 10 TABLET ORAL at 09:10

## 2020-06-09 RX ADMIN — HEPARIN SODIUM SCH: 5000 INJECTION, SOLUTION INTRAVENOUS; SUBCUTANEOUS at 02:24

## 2020-06-09 RX ADMIN — FOLIC ACID SCH MG: 1 TABLET ORAL at 09:09

## 2020-06-09 RX ADMIN — ATORVASTATIN CALCIUM SCH MG: 10 TABLET, FILM COATED ORAL at 21:00

## 2020-06-09 RX ADMIN — CLOPIDOGREL BISULFATE SCH MG: 75 TABLET, FILM COATED ORAL at 09:10

## 2020-06-09 RX ADMIN — HEPARIN SODIUM SCH UNIT: 5000 INJECTION, SOLUTION INTRAVENOUS; SUBCUTANEOUS at 21:00

## 2020-06-09 RX ADMIN — LISINOPRIL SCH MG: 20 TABLET ORAL at 09:10

## 2020-06-09 RX ADMIN — ATORVASTATIN CALCIUM SCH: 10 TABLET, FILM COATED ORAL at 02:25

## 2020-06-10 VITALS — SYSTOLIC BLOOD PRESSURE: 111 MMHG | TEMPERATURE: 97.8 F | HEART RATE: 66 BPM | DIASTOLIC BLOOD PRESSURE: 67 MMHG

## 2020-06-10 LAB
ALBUMIN SERPL-MCNC: 3.3 G/DL (ref 3.4–5)
ALP SERPL-CCNC: 55 U/L (ref 45–117)
ALT SERPL-CCNC: 32 U/L (ref 13–61)
ANION GAP SERPL CALC-SCNC: 6 MMOL/L (ref 8–16)
AST SERPL-CCNC: 15 U/L (ref 15–37)
BASOPHILS # BLD: 0.7 % (ref 0–2)
BILIRUB SERPL-MCNC: 0.4 MG/DL (ref 0.2–1)
BUN SERPL-MCNC: 16.8 MG/DL (ref 7–18)
CALCIUM SERPL-MCNC: 8.9 MG/DL (ref 8.5–10.1)
CHLORIDE SERPL-SCNC: 108 MMOL/L (ref 98–107)
CO2 SERPL-SCNC: 27 MMOL/L (ref 21–32)
CREAT SERPL-MCNC: 0.8 MG/DL (ref 0.55–1.3)
DEPRECATED RDW RBC AUTO: 15 % (ref 11.9–15.9)
EOSINOPHIL # BLD: 4.2 % (ref 0–4.5)
GLUCOSE SERPL-MCNC: 104 MG/DL (ref 74–106)
HCT VFR BLD CALC: 37.1 % (ref 35.4–49)
HGB BLD-MCNC: 12.4 GM/DL (ref 11.7–16.9)
LYMPHOCYTES # BLD: 19.9 % (ref 8–40)
MCH RBC QN AUTO: 27.9 PG (ref 25.7–33.7)
MCHC RBC AUTO-ENTMCNC: 33.4 G/DL (ref 32–35.9)
MCV RBC: 83.6 FL (ref 80–96)
MONOCYTES # BLD AUTO: 8.3 % (ref 3.8–10.2)
NEUTROPHILS # BLD: 66.9 % (ref 42.8–82.8)
PLATELET # BLD AUTO: 224 K/MM3 (ref 134–434)
PMV BLD: 9 FL (ref 7.5–11.1)
POTASSIUM SERPLBLD-SCNC: 4.1 MMOL/L (ref 3.5–5.1)
PROT SERPL-MCNC: 6.7 G/DL (ref 6.4–8.2)
RBC # BLD AUTO: 4.44 M/MM3 (ref 4–5.6)
SODIUM SERPL-SCNC: 141 MMOL/L (ref 136–145)
WBC # BLD AUTO: 8.1 K/MM3 (ref 4–10)

## 2020-06-10 RX ADMIN — FOLIC ACID SCH MG: 1 TABLET ORAL at 09:39

## 2020-06-10 RX ADMIN — HEPARIN SODIUM SCH UNIT: 5000 INJECTION, SOLUTION INTRAVENOUS; SUBCUTANEOUS at 09:39

## 2020-06-10 RX ADMIN — AMLODIPINE BESYLATE SCH MG: 10 TABLET ORAL at 09:39

## 2020-06-10 RX ADMIN — CLOPIDOGREL BISULFATE SCH MG: 75 TABLET, FILM COATED ORAL at 09:39

## 2020-06-10 RX ADMIN — TAMSULOSIN HYDROCHLORIDE SCH MG: 0.4 CAPSULE ORAL at 09:39

## 2020-06-10 RX ADMIN — ASPIRIN SCH MG: 81 TABLET, COATED ORAL at 09:39

## 2020-06-10 RX ADMIN — LISINOPRIL SCH MG: 20 TABLET ORAL at 09:39

## 2021-10-25 ENCOUNTER — HOSPITAL ENCOUNTER (EMERGENCY)
Dept: HOSPITAL 74 - JER | Age: 66
Discharge: HOME | End: 2021-10-25
Payer: COMMERCIAL

## 2021-10-25 VITALS — HEART RATE: 58 BPM | SYSTOLIC BLOOD PRESSURE: 122 MMHG | DIASTOLIC BLOOD PRESSURE: 58 MMHG | TEMPERATURE: 98 F

## 2021-10-25 VITALS — BODY MASS INDEX: 28 KG/M2

## 2021-10-25 DIAGNOSIS — N30.00: Primary | ICD-10-CM

## 2021-10-25 LAB
APPEARANCE UR: CLEAR
BACTERIA # UR AUTO: 3 /UL (ref 0–1359)
BILIRUB UR STRIP.AUTO-MCNC: NEGATIVE MG/DL
CASTS URNS QL MICRO: 1 /UL (ref 0–3.1)
COLOR UR: YELLOW
EPITH CASTS URNS QL MICRO: 4 /UL (ref 0–25.1)
KETONES UR QL STRIP: NEGATIVE
LEUKOCYTE ESTERASE UR QL STRIP.AUTO: (no result)
NITRITE UR QL STRIP: NEGATIVE
PH UR: 5 [PH] (ref 5–8)
PROT UR QL STRIP: (no result)
PROT UR QL STRIP: NEGATIVE
RBC # BLD AUTO: 5 /UL (ref 0–23.9)
SP GR UR: 1.02 (ref 1.01–1.03)
UROBILINOGEN UR STRIP-MCNC: 0.2 MG/DL (ref 0.2–1)
WBC # UR AUTO: 77 /UL (ref 0–25.8)

## 2021-12-27 ENCOUNTER — HOSPITAL ENCOUNTER (EMERGENCY)
Dept: HOSPITAL 74 - JERFT | Age: 66
Discharge: HOME | End: 2021-12-27
Payer: COMMERCIAL

## 2021-12-27 VITALS — TEMPERATURE: 98.9 F | SYSTOLIC BLOOD PRESSURE: 104 MMHG | DIASTOLIC BLOOD PRESSURE: 72 MMHG | HEART RATE: 80 BPM

## 2021-12-27 VITALS — BODY MASS INDEX: 28.1 KG/M2

## 2021-12-27 DIAGNOSIS — R35.0: Primary | ICD-10-CM

## 2021-12-27 DIAGNOSIS — E11.9: ICD-10-CM

## 2021-12-27 LAB
APPEARANCE UR: CLEAR
BACTERIA # UR AUTO: 8 /UL (ref 0–1359)
BILIRUB UR STRIP.AUTO-MCNC: NEGATIVE MG/DL
CASTS URNS QL MICRO: 3 /UL (ref 0–3.1)
COLOR UR: YELLOW
EPITH CASTS URNS QL MICRO: 7 /UL (ref 0–25.1)
KETONES UR QL STRIP: (no result)
LEUKOCYTE ESTERASE UR QL STRIP.AUTO: (no result)
NITRITE UR QL STRIP: NEGATIVE
PH UR: 5.5 [PH] (ref 5–8)
PROT UR QL STRIP: (no result)
PROT UR QL STRIP: NEGATIVE
RBC # BLD AUTO: 24 /UL (ref 0–23.9)
SP GR UR: 1.03 (ref 1.01–1.03)
UROBILINOGEN UR STRIP-MCNC: 0.2 MG/DL (ref 0.2–1)
WBC # UR AUTO: 11 /UL (ref 0–25.8)

## 2022-07-02 ENCOUNTER — HOSPITAL ENCOUNTER (EMERGENCY)
Dept: HOSPITAL 74 - JER | Age: 67
Discharge: HOME | End: 2022-07-02
Payer: COMMERCIAL

## 2022-07-02 VITALS — BODY MASS INDEX: 29 KG/M2

## 2022-07-02 VITALS — SYSTOLIC BLOOD PRESSURE: 129 MMHG | HEART RATE: 74 BPM | TEMPERATURE: 98 F | DIASTOLIC BLOOD PRESSURE: 76 MMHG

## 2022-07-02 DIAGNOSIS — R80.9: ICD-10-CM

## 2022-07-02 DIAGNOSIS — J01.90: Primary | ICD-10-CM

## 2022-07-02 DIAGNOSIS — N23: ICD-10-CM

## 2022-07-02 LAB
ALBUMIN SERPL-MCNC: 3.9 G/DL (ref 3.4–5)
ALP SERPL-CCNC: 56 U/L (ref 45–117)
ALT SERPL-CCNC: 26 U/L (ref 13–61)
ANION GAP SERPL CALC-SCNC: 4 MMOL/L (ref 8–16)
APPEARANCE UR: CLEAR
AST SERPL-CCNC: 14 U/L (ref 15–37)
BACTERIA # UR AUTO: 0 /UL (ref 0–1359)
BASOPHILS # BLD: 0.9 % (ref 0–2)
BILIRUB SERPL-MCNC: 0.4 MG/DL (ref 0.2–1)
BILIRUB UR STRIP.AUTO-MCNC: NEGATIVE MG/DL
BUN SERPL-MCNC: 26.6 MG/DL (ref 7–18)
CALCIUM SERPL-MCNC: 8.9 MG/DL (ref 8.5–10.1)
CASTS URNS QL MICRO: 1 /UL (ref 0–3.1)
CHLORIDE SERPL-SCNC: 110 MMOL/L (ref 98–107)
CO2 SERPL-SCNC: 30 MMOL/L (ref 21–32)
COLOR UR: YELLOW
CREAT SERPL-MCNC: 1.1 MG/DL (ref 0.55–1.3)
DEPRECATED RDW RBC AUTO: 13.9 % (ref 11.9–15.9)
EOSINOPHIL # BLD: 3.6 % (ref 0–4.5)
EPITH CASTS URNS QL MICRO: 4 /UL (ref 0–25.1)
GLUCOSE SERPL-MCNC: 112 MG/DL (ref 74–106)
HCT VFR BLD CALC: 39.4 % (ref 35.4–49)
HGB BLD-MCNC: 13.2 GM/DL (ref 11.7–16.9)
KETONES UR QL STRIP: NEGATIVE
LEUKOCYTE ESTERASE UR QL STRIP.AUTO: NEGATIVE
LYMPHOCYTES # BLD: 17.2 % (ref 8–40)
MCH RBC QN AUTO: 27.7 PG (ref 25.7–33.7)
MCHC RBC AUTO-ENTMCNC: 33.6 G/DL (ref 32–35.9)
MCV RBC: 82.6 FL (ref 80–96)
MONOCYTES # BLD AUTO: 6.7 % (ref 3.8–10.2)
NEUTROPHILS # BLD: 71.6 % (ref 42.8–82.8)
NITRITE UR QL STRIP: NEGATIVE
PH UR: 5 [PH] (ref 5–8)
PLATELET # BLD AUTO: 287 10^3/UL (ref 134–434)
PMV BLD: 8 FL (ref 7.5–11.1)
PROT SERPL-MCNC: 7.4 G/DL (ref 6.4–8.2)
PROT UR QL STRIP: (no result)
PROT UR QL STRIP: NEGATIVE
RBC # BLD AUTO: 4.76 M/MM3 (ref 4–5.6)
RBC # BLD AUTO: 6 /UL (ref 0–23.9)
SODIUM SERPL-SCNC: 144 MMOL/L (ref 136–145)
SP GR UR: 1.02 (ref 1.01–1.03)
UROBILINOGEN UR STRIP-MCNC: 0.2 MG/DL (ref 0.2–1)
WBC # BLD AUTO: 9 K/MM3 (ref 4–10)
WBC # UR AUTO: 4 /UL (ref 0–25.8)

## 2023-08-19 ENCOUNTER — HOSPITAL ENCOUNTER (EMERGENCY)
Dept: HOSPITAL 74 - JERFT | Age: 68
Discharge: HOME | End: 2023-08-19
Payer: COMMERCIAL

## 2023-08-19 VITALS
HEART RATE: 61 BPM | SYSTOLIC BLOOD PRESSURE: 145 MMHG | TEMPERATURE: 98.3 F | DIASTOLIC BLOOD PRESSURE: 80 MMHG | RESPIRATION RATE: 18 BRPM

## 2023-08-19 VITALS — BODY MASS INDEX: 30.3 KG/M2

## 2023-08-19 DIAGNOSIS — M76.31: ICD-10-CM

## 2023-08-19 DIAGNOSIS — M25.551: Primary | ICD-10-CM
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
## 2023-09-05 NOTE — PDOC
History of Present Illness





- General


Chief Complaint: Urinary Problem


Stated Complaint: UTI


Time Seen by Provider: 06/21/19 16:57





- History of Present Illness


Initial Comments: 





06/21/19 22:58


Chief complaint: Dysuria





Patient is a 63-year-old male with a history of NIDDM, psoriasis, patient 

recently started on medication for psoriasis, about a month ago. Patient 

complaining of 3 days of pain on urination, no fever, no back pain, no 

discharge. Patient states she has not been sexually active in 2 years.





GENERAL/CONSTITUTIONAL: No fever, weakness. dizziness


HEAD, EYES, EARS, NOSE AND THROAT: No change in vision. No ear pain or 

discharge. No sore throat.


CARDIOVASCULAR: No chest pain


RESPIRATORY: No shortness of breath or cough


GASTROINTESTINAL: No pain, nausea, vomiting, diarrhea or constipation


GENITOURINARY: +dysuria


MUSCULOSKELETAL:  No neck or back pain


SKIN: No rash


NEUROLOGIC: No headache, vertigo, loss of consciousness, or loss of sensation.








GENERAL: The patient is awake, alert, and fully oriented, in no acute distress.


HEAD: Normal with no signs of trauma.


EYES: Pupils equal, round and reactive to light, sclera anicteric, conjunctiva 

clear.


ENT: pharynx: no erythema, no exudate, uvula midline


NECK: supple


CHEST: clear, nontender, rr


ABD: soft, nontender


BACK: no tenderness or signs of injury


EXTREMITIES: Normal range of motion, no edema.


NEUROLOGICAL: Normal speech, normal gait.


SKIN: Warm, Dry





Past History





- Past Medical History


Allergies/Adverse Reactions: 


 Allergies











Allergy/AdvReac Type Severity Reaction Status Date / Time


 


No Known Allergies Allergy   Verified 06/21/19 16:57











Home Medications: 


Ambulatory Orders





Apremilast [Otezla] 30 mg PO ASDIR 06/21/19 


Cefpodoxime Proxetil [Vantin -] 200 mg PO Q12H #14 tablet 06/21/19 


Tamsulosin HCl [Flomax] 0.4 mg PO DAILY 06/21/19 








Anemia: No


Asthma: No


Cancer: No


Cardiac Disorders: No


CVA: No


COPD: No


CHF: No


DVT: No


Dementia: No


Diabetes: Yes (NIDDM)


GI Disorders: No


 Disorders: No


HTN: Yes


Hypercholesterolemia: Yes


Liver Disease: No


Seizures: No


Thyroid Disease: No





- Surgical History


Abdominal Surgery: Yes (HERNIA)


Appendectomy: Yes


Cardiac Surgery: No


Cholecystectomy: Yes


Lung Surgery: No


Neurologic Surgery: No


Orthopedic Surgery: No





- Suicide/Smoking/Psychosocial Hx


Smoking Status: Yes


Smoking History: Current every day smoker


Have you smoked in the past 12 months: Yes


Number of Cigarettes Smoked Daily: 10


If you are a former smoker, when did you quit?: 2 weeks ago


Information on smoking cessation initiated: No


'Breaking Loose' booklet given: 07/12/15


Hx Alcohol Use: No


Drug/Substance Use Hx: No


Substance Use Type: None


Hx Substance Use Treatment: No





*Physical Exam





- Vital Signs


 Last Vital Signs











Temp Pulse Resp BP Pulse Ox


 


 98 F   81   18   150/74   97 


 


 06/21/19 16:58  06/21/19 16:58  06/21/19 16:58  06/21/19 16:58  06/21/19 16:58














ED Treatment Course





- LABORATORY


CBC & Chemistry Diagram: 


 06/21/19 17:45





 06/21/19 17:45





Medical Decision Making





- Medical Decision Making








63-year-old male with history of NIDDM, on metformin, with 3 days of dysuria. 

Patient also has psoriasis and is on otezla. Patient appears well, no fever, no 

back aches or other concerning clinical findings. Patient will get basic labs, 

check kidney function, check glucose level, check urine


06/21/19 23:04








Labs are stable, UA shows small amount of white cells, given symptoms, will 

treat with Vantin, have patient follow-up with his doctor on Monday, strict 

return instructions.





*DC/Admit/Observation/Transfer


Diagnosis at time of Disposition: 


 Dysuria








- Discharge Dispostion


Disposition: HOME


Condition at time of disposition: Stable





- Prescriptions


Prescriptions: 


Cefpodoxime Proxetil [Vantin -] 200 mg PO Q12H #14 tablet





- Referrals


Schedule a call back: 


call patient please





- Patient Instructions


Additional Instructions: 


Drink 2-3 L of water daily





Take the vantin 1 tab twice a day for 7 days





take Acidophilus to help prevent yeast infection or stomach upset





Return ER if fever, vomiting, feeling sicker





Follow-up with your doctor in 2-3 days





- Post Discharge Activity Referral placed